# Patient Record
Sex: FEMALE | Race: WHITE | URBAN - METROPOLITAN AREA
[De-identification: names, ages, dates, MRNs, and addresses within clinical notes are randomized per-mention and may not be internally consistent; named-entity substitution may affect disease eponyms.]

---

## 2018-07-19 ENCOUNTER — DOCTOR'S OFFICE (OUTPATIENT)
Dept: URBAN - METROPOLITAN AREA CLINIC 137 | Facility: CLINIC | Age: 63
Setting detail: OPHTHALMOLOGY
End: 2018-07-19
Payer: COMMERCIAL

## 2018-07-19 ENCOUNTER — RX ONLY (RX ONLY)
Age: 63
End: 2018-07-19

## 2018-07-19 DIAGNOSIS — H52.4: ICD-10-CM

## 2018-07-19 PROCEDURE — 92004 COMPRE OPH EXAM NEW PT 1/>: CPT | Performed by: OPHTHALMOLOGY

## 2018-07-19 ASSESSMENT — REFRACTION_AUTOREFRACTION
OD_AXIS: 008
OS_AXIS: 008
OS_SPHERE: -0.75
OD_SPHERE: +2.00
OS_CYLINDER: +1.25
OD_CYLINDER: +1.25

## 2018-07-19 ASSESSMENT — REFRACTION_OUTSIDERX
OS_SPHERE: -0.75
OD_SPHERE: +2.00
OS_ADD: +2.25
OS_AXIS: 180
OS_VA1: 20/25+3
OD_AXIS: 180
OD_VA3: 20/
OD_VA1: 20/20
OS_CYLINDER: +1.25
OS_VA2: 20/20
OS_VA3: 20/
OD_VA2: 20/20
OD_ADD: +2.25
OU_VA: 20/20
OD_CYLINDER: +1.00

## 2018-07-19 ASSESSMENT — REFRACTION_MANIFEST
OS_VA2: 20/
OD_VA1: 20/
OD_VA2: 20/
OS_VA3: 20/
OS_VA1: 20/
OD_VA3: 20/
OU_VA: 20/
OS_VA1: 20/
OU_VA: 20/
OD_VA1: 20/
OD_VA2: 20/
OD_VA3: 20/
OS_VA2: 20/
OS_VA3: 20/

## 2018-07-19 ASSESSMENT — REFRACTION_CURRENTRX
OD_SPHERE: +1.50
OD_AXIS: 175
OS_ADD: +2.25
OD_OVR_VA: 20/
OS_SPHERE: +1.75
OD_VPRISM_DIRECTION: PROGS
OS_AXIS: 020
OD_OVR_VA: 20/
OD_ADD: +2.25
OD_OVR_VA: 20/
OD_CYLINDER: +1.25
OS_OVR_VA: 20/
OS_VPRISM_DIRECTION: PROGS
OS_CYLINDER: +1.75
OS_OVR_VA: 20/
OS_OVR_VA: 20/

## 2018-07-19 ASSESSMENT — SPHEQUIV_DERIVED
OS_SPHEQUIV: -0.125
OD_SPHEQUIV: 2.625

## 2018-07-19 ASSESSMENT — VISUAL ACUITY
OS_BCVA: 20/20-1
OD_BCVA: 20/400

## 2018-07-19 ASSESSMENT — CONFRONTATIONAL VISUAL FIELD TEST (CVF)
OS_FINDINGS: FULL
OD_FINDINGS: FULL

## 2018-08-14 LAB — HCV AB SER-ACNC: NON REACTIVE

## 2018-08-28 ENCOUNTER — DOCTOR'S OFFICE (OUTPATIENT)
Dept: URBAN - METROPOLITAN AREA CLINIC 136 | Facility: CLINIC | Age: 63
Setting detail: OPHTHALMOLOGY
End: 2018-08-28
Payer: COMMERCIAL

## 2018-08-28 DIAGNOSIS — H25.013: ICD-10-CM

## 2018-08-28 PROBLEM — H52.4 PRESBYOPIA ; BOTH EYES: Status: ACTIVE | Noted: 2018-07-19

## 2018-08-28 PROCEDURE — 92014 COMPRE OPH EXAM EST PT 1/>: CPT | Performed by: OPHTHALMOLOGY

## 2018-08-28 ASSESSMENT — VISUAL ACUITY
OS_BCVA: 20/70+1
OD_BCVA: 20/300

## 2018-08-28 ASSESSMENT — REFRACTION_CURRENTRX
OS_AXIS: 020
OS_OVR_VA: 20/
OD_OVR_VA: 20/
OS_OVR_VA: 20/
OS_SPHERE: +1.75
OS_OVR_VA: 20/
OS_CYLINDER: +1.75
OD_SPHERE: +1.50
OD_OVR_VA: 20/
OD_OVR_VA: 20/
OD_AXIS: 175
OD_CYLINDER: +1.25
OS_ADD: +2.25
OD_ADD: +2.25
OS_VPRISM_DIRECTION: PROGS
OD_VPRISM_DIRECTION: PROGS

## 2018-08-28 ASSESSMENT — REFRACTION_MANIFEST
OD_VA2: 20/
OD_VA2: 20/20
OU_VA: 20/20
OS_ADD: +2.25
OS_VA3: 20/
OS_VA1: 20/
OD_VA3: 20/
OD_ADD: +2.25
OD_SPHERE: +2.00
OD_VA1: 20/
OS_VA2: 20/
OD_VA1: 20/20
OS_VA2: 20/20
OD_AXIS: 180
OS_AXIS: 180
OS_CYLINDER: +1.25
OS_VA1: 20/25+3
OS_SPHERE: -0.75
OU_VA: 20/
OD_CYLINDER: +1.00
OD_VA3: 20/
OS_VA3: 20/

## 2018-08-28 ASSESSMENT — SPHEQUIV_DERIVED
OS_SPHEQUIV: 0
OD_SPHEQUIV: 3.125
OS_SPHEQUIV: -0.125
OD_SPHEQUIV: 2.5

## 2018-08-28 ASSESSMENT — REFRACTION_AUTOREFRACTION
OS_AXIS: 105
OS_SPHERE: +0.75
OD_AXIS: 102
OS_CYLINDER: -1.50
OD_CYLINDER: -1.25
OD_SPHERE: +3.75

## 2018-08-28 ASSESSMENT — CONFRONTATIONAL VISUAL FIELD TEST (CVF)
OD_FINDINGS: FULL
OS_FINDINGS: FULL

## 2020-08-28 LAB
LEFT EYE DIABETIC RETINOPATHY: NORMAL
RIGHT EYE DIABETIC RETINOPATHY: NORMAL

## 2020-10-25 PROBLEM — E11.9 TYPE 2 DIABETES MELLITUS WITHOUT COMPLICATION, WITHOUT LONG-TERM CURRENT USE OF INSULIN (HCC): Status: ACTIVE | Noted: 2020-08-25

## 2020-10-26 ENCOUNTER — OFFICE VISIT (OUTPATIENT)
Dept: FAMILY MEDICINE CLINIC | Facility: CLINIC | Age: 65
End: 2020-10-26
Payer: MEDICARE

## 2020-10-26 VITALS
HEART RATE: 63 BPM | RESPIRATION RATE: 16 BRPM | WEIGHT: 235.4 LBS | BODY MASS INDEX: 40.19 KG/M2 | DIASTOLIC BLOOD PRESSURE: 80 MMHG | OXYGEN SATURATION: 97 % | TEMPERATURE: 98 F | HEIGHT: 64 IN | SYSTOLIC BLOOD PRESSURE: 136 MMHG

## 2020-10-26 DIAGNOSIS — R06.02 SHORTNESS OF BREATH ON EXERTION: ICD-10-CM

## 2020-10-26 DIAGNOSIS — R60.0 LOWER EXTREMITY EDEMA: ICD-10-CM

## 2020-10-26 DIAGNOSIS — E78.5 HYPERLIPIDEMIA, UNSPECIFIED HYPERLIPIDEMIA TYPE: ICD-10-CM

## 2020-10-26 DIAGNOSIS — E11.9 TYPE 2 DIABETES MELLITUS WITHOUT COMPLICATION, WITHOUT LONG-TERM CURRENT USE OF INSULIN (HCC): Primary | ICD-10-CM

## 2020-10-26 DIAGNOSIS — Z00.00 MEDICARE ANNUAL WELLNESS VISIT, INITIAL: ICD-10-CM

## 2020-10-26 DIAGNOSIS — Z12.2 ENCOUNTER FOR SCREENING FOR LUNG CANCER: ICD-10-CM

## 2020-10-26 DIAGNOSIS — Z76.89 ENCOUNTER TO ESTABLISH CARE: ICD-10-CM

## 2020-10-26 DIAGNOSIS — Z78.0 POST-MENOPAUSAL: ICD-10-CM

## 2020-10-26 DIAGNOSIS — Z23 INFLUENZA VACCINE NEEDED: ICD-10-CM

## 2020-10-26 DIAGNOSIS — Z87.891 HISTORY OF TOBACCO ABUSE: ICD-10-CM

## 2020-10-26 DIAGNOSIS — E66.01 CLASS 3 SEVERE OBESITY DUE TO EXCESS CALORIES WITH SERIOUS COMORBIDITY AND BODY MASS INDEX (BMI) OF 40.0 TO 44.9 IN ADULT (HCC): ICD-10-CM

## 2020-10-26 PROBLEM — E66.813 CLASS 3 SEVERE OBESITY DUE TO EXCESS CALORIES WITH SERIOUS COMORBIDITY AND BODY MASS INDEX (BMI) OF 40.0 TO 44.9 IN ADULT (HCC): Status: ACTIVE | Noted: 2020-10-26

## 2020-10-26 PROBLEM — R06.09 DOE (DYSPNEA ON EXERTION): Status: ACTIVE | Noted: 2020-08-25

## 2020-10-26 PROBLEM — H26.9 CATARACTS, BILATERAL: Status: ACTIVE | Noted: 2020-10-26

## 2020-10-26 PROBLEM — R06.00 DOE (DYSPNEA ON EXERTION): Status: ACTIVE | Noted: 2020-08-25

## 2020-10-26 PROBLEM — R92.30 DENSE BREAST: Status: ACTIVE | Noted: 2018-09-07

## 2020-10-26 PROBLEM — R92.2 DENSE BREAST: Status: ACTIVE | Noted: 2018-09-07

## 2020-10-26 PROBLEM — H91.93 BILATERAL HEARING LOSS: Status: ACTIVE | Noted: 2020-08-25

## 2020-10-26 LAB
CREAT UR-MCNC: 107 MG/DL
MICROALBUMIN UR-MCNC: 19.6 MG/L (ref 0–20)
MICROALBUMIN/CREAT 24H UR: 18 MG/G CREATININE (ref 0–30)
SL AMB POCT HEMOGLOBIN AIC: 9.2 (ref ?–6.5)

## 2020-10-26 PROCEDURE — G0402 INITIAL PREVENTIVE EXAM: HCPCS | Performed by: NURSE PRACTITIONER

## 2020-10-26 PROCEDURE — 36416 COLLJ CAPILLARY BLOOD SPEC: CPT | Performed by: NURSE PRACTITIONER

## 2020-10-26 PROCEDURE — 90682 RIV4 VACC RECOMBINANT DNA IM: CPT | Performed by: NURSE PRACTITIONER

## 2020-10-26 PROCEDURE — 82570 ASSAY OF URINE CREATININE: CPT | Performed by: NURSE PRACTITIONER

## 2020-10-26 PROCEDURE — 99204 OFFICE O/P NEW MOD 45 MIN: CPT | Performed by: NURSE PRACTITIONER

## 2020-10-26 PROCEDURE — 83036 HEMOGLOBIN GLYCOSYLATED A1C: CPT | Performed by: NURSE PRACTITIONER

## 2020-10-26 PROCEDURE — 82043 UR ALBUMIN QUANTITATIVE: CPT | Performed by: NURSE PRACTITIONER

## 2020-10-26 PROCEDURE — G0008 ADMIN INFLUENZA VIRUS VAC: HCPCS | Performed by: NURSE PRACTITIONER

## 2020-11-02 ENCOUNTER — TELEPHONE (OUTPATIENT)
Dept: ADMINISTRATIVE | Facility: OTHER | Age: 65
End: 2020-11-02

## 2021-01-04 ENCOUNTER — APPOINTMENT (OUTPATIENT)
Dept: LAB | Facility: CLINIC | Age: 66
End: 2021-01-04
Payer: MEDICARE

## 2021-01-04 ENCOUNTER — OFFICE VISIT (OUTPATIENT)
Dept: FAMILY MEDICINE CLINIC | Facility: CLINIC | Age: 66
End: 2021-01-04
Payer: MEDICARE

## 2021-01-04 VITALS
DIASTOLIC BLOOD PRESSURE: 80 MMHG | HEIGHT: 64 IN | RESPIRATION RATE: 18 BRPM | HEART RATE: 81 BPM | OXYGEN SATURATION: 99 % | BODY MASS INDEX: 37.8 KG/M2 | SYSTOLIC BLOOD PRESSURE: 130 MMHG | TEMPERATURE: 97.5 F | WEIGHT: 221.38 LBS

## 2021-01-04 DIAGNOSIS — E11.65 UNCONTROLLED TYPE 2 DIABETES MELLITUS WITH HYPERGLYCEMIA (HCC): ICD-10-CM

## 2021-01-04 DIAGNOSIS — E11.9 TYPE 2 DIABETES MELLITUS WITHOUT COMPLICATION, WITHOUT LONG-TERM CURRENT USE OF INSULIN (HCC): ICD-10-CM

## 2021-01-04 DIAGNOSIS — R06.00 DOE (DYSPNEA ON EXERTION): ICD-10-CM

## 2021-01-04 DIAGNOSIS — E78.5 HYPERLIPIDEMIA, UNSPECIFIED HYPERLIPIDEMIA TYPE: ICD-10-CM

## 2021-01-04 DIAGNOSIS — Z01.818 PRE-OPERATIVE EXAM: Primary | ICD-10-CM

## 2021-01-04 DIAGNOSIS — H26.9 CATARACT OF BOTH EYES, UNSPECIFIED CATARACT TYPE: ICD-10-CM

## 2021-01-04 DIAGNOSIS — E66.01 CLASS 2 SEVERE OBESITY DUE TO EXCESS CALORIES WITH SERIOUS COMORBIDITY AND BODY MASS INDEX (BMI) OF 38.0 TO 38.9 IN ADULT (HCC): ICD-10-CM

## 2021-01-04 PROBLEM — E66.812 CLASS 2 SEVERE OBESITY DUE TO EXCESS CALORIES WITH SERIOUS COMORBIDITY AND BODY MASS INDEX (BMI) OF 38.0 TO 38.9 IN ADULT (HCC): Status: ACTIVE | Noted: 2020-10-26

## 2021-01-04 LAB
ALBUMIN SERPL BCP-MCNC: 3.5 G/DL (ref 3.5–5)
ALP SERPL-CCNC: 155 U/L (ref 46–116)
ALT SERPL W P-5'-P-CCNC: 24 U/L (ref 12–78)
ANION GAP SERPL CALCULATED.3IONS-SCNC: 6 MMOL/L (ref 4–13)
AST SERPL W P-5'-P-CCNC: 11 U/L (ref 5–45)
BASOPHILS # BLD AUTO: 0.06 THOUSANDS/ΜL (ref 0–0.1)
BASOPHILS NFR BLD AUTO: 1 % (ref 0–1)
BILIRUB SERPL-MCNC: 0.41 MG/DL (ref 0.2–1)
BUN SERPL-MCNC: 9 MG/DL (ref 5–25)
CALCIUM SERPL-MCNC: 9.8 MG/DL (ref 8.3–10.1)
CHLORIDE SERPL-SCNC: 104 MMOL/L (ref 100–108)
CHOLEST SERPL-MCNC: 227 MG/DL (ref 50–200)
CO2 SERPL-SCNC: 29 MMOL/L (ref 21–32)
CREAT SERPL-MCNC: 0.78 MG/DL (ref 0.6–1.3)
EOSINOPHIL # BLD AUTO: 0.19 THOUSAND/ΜL (ref 0–0.61)
EOSINOPHIL NFR BLD AUTO: 2 % (ref 0–6)
ERYTHROCYTE [DISTWIDTH] IN BLOOD BY AUTOMATED COUNT: 12.9 % (ref 11.6–15.1)
GFR SERPL CREATININE-BSD FRML MDRD: 80 ML/MIN/1.73SQ M
GLUCOSE P FAST SERPL-MCNC: 327 MG/DL (ref 65–99)
HCT VFR BLD AUTO: 41.4 % (ref 34.8–46.1)
HDLC SERPL-MCNC: 71 MG/DL
HGB BLD-MCNC: 12.6 G/DL (ref 11.5–15.4)
IMM GRANULOCYTES # BLD AUTO: 0.05 THOUSAND/UL (ref 0–0.2)
IMM GRANULOCYTES NFR BLD AUTO: 1 % (ref 0–2)
LDLC SERPL CALC-MCNC: 138 MG/DL (ref 0–100)
LYMPHOCYTES # BLD AUTO: 2.09 THOUSANDS/ΜL (ref 0.6–4.47)
LYMPHOCYTES NFR BLD AUTO: 20 % (ref 14–44)
MCH RBC QN AUTO: 27.5 PG (ref 26.8–34.3)
MCHC RBC AUTO-ENTMCNC: 30.4 G/DL (ref 31.4–37.4)
MCV RBC AUTO: 90 FL (ref 82–98)
MONOCYTES # BLD AUTO: 0.57 THOUSAND/ΜL (ref 0.17–1.22)
MONOCYTES NFR BLD AUTO: 6 % (ref 4–12)
NEUTROPHILS # BLD AUTO: 7.29 THOUSANDS/ΜL (ref 1.85–7.62)
NEUTS SEG NFR BLD AUTO: 70 % (ref 43–75)
NRBC BLD AUTO-RTO: 0 /100 WBCS
PLATELET # BLD AUTO: 295 THOUSANDS/UL (ref 149–390)
PMV BLD AUTO: 11.7 FL (ref 8.9–12.7)
POTASSIUM SERPL-SCNC: 4.3 MMOL/L (ref 3.5–5.3)
PROT SERPL-MCNC: 7.6 G/DL (ref 6.4–8.2)
RBC # BLD AUTO: 4.59 MILLION/UL (ref 3.81–5.12)
SL AMB POCT HEMOGLOBIN AIC: 11.8 (ref ?–6.5)
SODIUM SERPL-SCNC: 139 MMOL/L (ref 136–145)
TRIGL SERPL-MCNC: 90 MG/DL
TSH SERPL DL<=0.05 MIU/L-ACNC: 1.82 UIU/ML (ref 0.36–3.74)
WBC # BLD AUTO: 10.25 THOUSAND/UL (ref 4.31–10.16)

## 2021-01-04 PROCEDURE — 99214 OFFICE O/P EST MOD 30 MIN: CPT | Performed by: NURSE PRACTITIONER

## 2021-01-04 PROCEDURE — 84443 ASSAY THYROID STIM HORMONE: CPT

## 2021-01-04 PROCEDURE — 80053 COMPREHEN METABOLIC PANEL: CPT

## 2021-01-04 PROCEDURE — 80061 LIPID PANEL: CPT

## 2021-01-04 PROCEDURE — 83036 HEMOGLOBIN GLYCOSYLATED A1C: CPT | Performed by: NURSE PRACTITIONER

## 2021-01-04 PROCEDURE — 36415 COLL VENOUS BLD VENIPUNCTURE: CPT

## 2021-01-04 PROCEDURE — 93000 ELECTROCARDIOGRAM COMPLETE: CPT | Performed by: NURSE PRACTITIONER

## 2021-01-04 PROCEDURE — 85025 COMPLETE CBC W/AUTO DIFF WBC: CPT

## 2021-01-04 RX ORDER — BESIFLOXACIN 6 MG/ML
SUSPENSION OPHTHALMIC
COMMUNITY
Start: 2021-01-04

## 2021-01-04 RX ORDER — BROMFENAC SODIUM 0.7 MG/ML
SOLUTION/ DROPS OPHTHALMIC
COMMUNITY
Start: 2021-01-04

## 2021-01-04 NOTE — PROGRESS NOTES
FAMILY PRACTICE OFFICE VISIT       NAME: Liu Mcdaniel  AGE: 72 y o  SEX: female       : 1955        MRN: [id number]    Assessment and Plan     Problem List Items Addressed This Visit        Other    WILKES (dyspnea on exertion)    Class 2 severe obesity due to excess calories with serious comorbidity and body mass index (BMI) of 38 0 to 38 9 in adult (HCC)    Cataracts, bilateral    Relevant Medications    Besivance 0 6 % SUSP    Prolensa 0 07 % SOLN    Hyperlipidemia      Other Visit Diagnoses     Pre-operative exam    -  Primary    Relevant Orders    POCT ECG (Completed)    Uncontrolled type 2 diabetes mellitus with hyperglycemia ([facility])        Relevant Medications    sitaGLIPtin (JANUVIA) 100 mg tablet    Other Relevant Orders    Glucometer    Glucometer test strips    Lancets    POCT hemoglobin A1c (Completed)    Ambulatory referral to clinical pharmacy          1  Pre-operative exam  This 60-year-old female presents today for preoperative examination  She is scheduled for cataract surgery: Left eye on 2021, right eye  2021  In office ECG today shows normal sinus rhythm, left axis deviation, no acute ischemia, no comparison is available  Echocardiogram completed 6229 shows systolic function is normal with an ejection fraction of 55-60%  Wall motion is within normal limits  There is grade 1 mild diastolic dysfunction  She has ongoing shortness of breath with exertion present >6 months, for which she is in the process of completing workup  She has uncontrolled diabetes, which we are working to lower A1c  Chronic conditions are not under good control, however,  given short, minor procedure, with monitored anesthesia she is acceptable risk to proceed with cataract surgery as scheduled  We will continue to work on gaining better control over chronic conditions  Reviewed plan of care with Dr Pino Bhardwaj  POCT ECG   2   Cataract of both eyes, unspecified cataract type  Bilateral cataracts appreciated on exam today, left eye greater than right eye    3  Uncontrolled type 2 diabetes mellitus with hyperglycemia ([address] )    Barbra Mcbride was seen for the 1st time in our office approximately 2 months ago  Hemoglobin A1c at that time was 9 4%  She had previously been prescribed metformin, however after taking 1 dose of this medication developed GI upset and never took another dose  She did not contact her previous PCP to discuss  At last office visit, I prescribed Januvia 100 mg daily  She is only taking this medications sporadically, admits she forgets to take it, and has missed many doses  Hemoglobin A1c today is 11 8%  We had a lengthy discussion on importance of controlling diabetes and long-term consequences of uncontrolled sugars  She verbalizes understanding  Prescriptions provided for glucometer  I recommend starting a new routine, waking up each morning, checking her blood glucose, eating a good breakfast, and taking Januvia  Admits she is the primary caregiver for her mother, and has been forgetting to care for herself  States this should get better as she has now reached out to her family for help  She is able to do daily fasting blood sugars, she checks blood sugars at home on her mother  We discussed importance of avoiding sweets, reducing carbohydrates, and regular exercise  She is agreeable to consult with our clinical pharmacist, Brant Puente to help gain better control her blood glucoses  She will follow up with me in 1 month  Glucometer    Glucometer test strips    Lancets    sitaGLIPtin (JANUVIA) 100 mg tablet    POCT hemoglobin A1c    Ambulatory referral to clinical pharmacy   4  Class 2 severe obesity due to excess calories with serious comorbidity and body mass index (BMI) of 38 0 to 38 9 in [facility])  At last office visit, I referred her to weight management, she had scheduled appointment but needed to cancel it due to she could not leave her mother alone    I have encouraged her to reschedule this appointment  5  WILKES (dyspnea on exertion)  Shortness of breath with exertion has been present stable for greater than 6 months now, unclear etiology  She was evaluated by Cardiology in August, recommended to have a echo stress test   She has not proceeded with this yet  I continue to recommend she proceed with the echo stress test   Echocardiogram was completed as noted above  As per patient request she has been referred to a new cardiologist, a female cardiologist   She did schedule an appointment, however was canceled by the cardiologist's office  She plans to reschedule  6  Hyperlipidemia, unspecified hyperlipidemia type  Hyperlipidemia, she will be repeating lipid panel, will consider starting statin pending results  Chief Complaint     Chief Complaint   Patient presents with    Pre-op Exam     1/11/2021 2/1/2021 with dr Mary Jo Vazquez        History of Present Illness     Corina Erazo is a 72year old female with diabetes, hyperlipidemia, and obesity presenting today for preoperative exam     She is scheduled for cataract surgery, left eye on January 11th, and right eye on February 1st, with Dr Milly England  Admits she has had difficulty getting to appointments recently due to caring for her mother  Her 25-year-old mother lives with her and Eric Black is her primary caretaker  Mom has been getting worse, and she is afraid to leave mom alone  Carey's daughter is currently home, and is able to help  Once her daughter leaves, Carey's brother and sister-in-law will be helping her  Uncontrolled diabetes: We started Januvia at last office visit  She is tolerating well, admits she has difficulty remembering to take the medication  She misses a lot of doses  She is trying to eat better  Review of Systems   Review of Systems   Constitutional: Negative  HENT: Negative  Eyes: Positive for visual disturbance     Respiratory: Positive for shortness of breath  Negative for cough ( with exertion) and wheezing  Cardiovascular: Positive for leg swelling ( bilateral lower extremity edema is at baseline)  Negative for chest pain and palpitations  Gastrointestinal: Negative  Endocrine: Negative for polydipsia, polyphagia and polyuria  Genitourinary: Negative  Musculoskeletal: Negative  Skin: Negative  Neurological: Negative  Hematological: Negative  Psychiatric/Behavioral: Negative          Active Problem List     Patient Active Problem List   Diagnosis    Type 2 diabetes mellitus without complication, without long-term current use of insulin (HCC)    WILKES (dyspnea on exertion)    Dense breast    Bilateral hearing loss    Class 2 severe obesity due to excess calories with serious comorbidity and body mass index (BMI) of 38 0 to 38 9 in adult ([facility] [address] )    Cataracts, bilateral    Hyperlipidemia       Past Medical History:  Past Medical History:   Diagnosis Date    Varicella        Past Surgical History:  Past Surgical History:   Procedure Laterality Date    NO PAST SURGERIES         Family History:  Family History   Problem Relation Age of Onset    Diabetes Mother     Hyperlipidemia Mother     Hypertension Mother     Atrial fibrillation Mother     Breast cancer Sister     Coronary artery disease Brother         3 stents    Hypertension Brother     Hyperlipidemia Brother     Thyroid cancer Daughter     Lung cancer Maternal Grandfather     Cervical cancer Cousin        Social History:  Social History     Socioeconomic History    Marital status:      Spouse name: Not on file    Number of children: Not on file    Years of education: Not on file    Highest education level: Not on file   Occupational History    Not on file   Social Needs    Financial resource strain: Not on file    Food insecurity     Worry: Not on file     Inability: Not on file    Transportation needs     Medical: Not on file     Non-medical: Not on file   Tobacco Use    Smoking status: Former Smoker     Packs/day: 1 50     Years: 40 00     Pack years: 60 00     Types: Cigarettes     Quit date: 10/26/2012     Years since quittin 2    Smokeless tobacco: Never Used   Substance and Sexual Activity    Alcohol use: Yes     Comment: occasionally    Drug use: Never    Sexual activity: Not on file   Lifestyle    Physical activity     Days per week: Not on file     Minutes per session: Not on file    Stress: Not on file   Relationships    Social connections     Talks on phone: Not on file     Gets together: Not on file     Attends [employer] service: Not on file     Active member of club or organization: Not on file     Attends meetings of clubs or organizations: Not on file     Relationship status: Not on file    Intimate partner violence     Fear of current or ex partner: Not on file     Emotionally abused: Not on file     Physically abused: Not on file     Forced sexual activity: Not on file   Other Topics Concern    Not on file   Social History Narrative        1 daughter    Lives with her daughter and mother    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       I have reviewed the patient's medical history in detail; there are no changes to the history as noted in the electronic medical record  Objective     Vitals:    21 1106 21 1130   BP: 140/90 130/80   Pulse: 81    Resp: 18    Temp: 97 5 °F (36 4 °C)    SpO2: 99%    Weight: 100 kg (221 lb 6 oz)    Height: 5' 4" (1 626 m)      Wt Readings from Last 3 Encounters:   21 100 kg (221 lb 6 oz)   10/26/20 107 kg (235 lb 6 4 oz)     Physical Exam  Vitals signs and nursing note reviewed  Constitutional:       General: She is not in acute distress  Appearance: Normal appearance  She is well-developed  She is obese  She is not ill-appearing, toxic-appearing or diaphoretic  HENT:      Head: Normocephalic and atraumatic        Right Ear: Tympanic membrane and ear canal normal       Left Ear: Tympanic membrane and ear canal normal    Eyes:      Conjunctiva/sclera: Conjunctivae normal       Pupils: Pupils are equal, round, and reactive to light  Comments:  Bilateral cataracts, left greater than right   Neck:      Musculoskeletal: Normal range of motion and neck supple  Thyroid: No thyromegaly  Cardiovascular:      Rate and Rhythm: Normal rate and regular rhythm  Heart sounds: No murmur  Pulmonary:      Effort: Pulmonary effort is normal  No respiratory distress  Breath sounds: Normal breath sounds  No wheezing or rales  Abdominal:      General: Bowel sounds are normal       Palpations: Abdomen is soft  Tenderness: There is no abdominal tenderness  Musculoskeletal:         General: No deformity  Right lower le+ Pitting Edema present  Left lower le+ Pitting Edema present  Lymphadenopathy:      Cervical: No cervical adenopathy  Skin:     Findings: No rash  Neurological:      Mental Status: She is alert and oriented to person, place, and time  Psychiatric:         Mood and Affect: Mood normal          Behavior: Behavior normal             ALLERGIES:  No Known Allergies    Current Medications     Current Outpatient Medications   Medication Sig Dispense Refill    sitaGLIPtin (JANUVIA) 100 mg tablet Take 1 tablet (100 mg total) by mouth daily 30 tablet 3    atorvastatin (LIPITOR) 10 mg tablet Take 1 tablet (10 mg total) by mouth daily 30 tablet 5    Besivance 0 6 % SUSP       Prolensa 0 07 % SOLN        No current facility-administered medications for this visit            Health Maintenance     Health Maintenance   Topic Date Due    HIV Screening  10/28/1970    DTaP,Tdap,and Td Vaccines (2 - Td) 2017    Pneumococcal Vaccine: 65+ Years (1 of 1 - PPSV23) 10/28/2020    HEMOGLOBIN A1C  2021    MAMMOGRAM  2021    DM Eye Exam  2021    Fall Risk  10/26/2021    Depression Screening PHQ  10/26/2021    Medicare Annual Wellness Visit (AWV)  10/26/2021    BMI: Followup Plan  10/26/2021    Diabetic Foot Exam  10/26/2021    URINE MICROALBUMIN  10/26/2021    BMI: Adult  01/04/2022    Cervical Cancer Screening  09/07/2023    Colorectal Cancer Screening  10/12/2028    Hepatitis C Screening  Completed    Influenza Vaccine  Completed    HIB Vaccine  Aged Out    Hepatitis B Vaccine  Aged Out    IPV Vaccine  Aged Out    Hepatitis A Vaccine  Aged Out    Meningococcal ACWY Vaccine  Aged Out    HPV Vaccine  Aged Out     Immunization History   Administered Date(s) Administered    INFLUENZA 09/19/2015, 09/06/2018, 10/23/2020    Influenza Quadrivalent Recombinant Preservative Free IM 10/23/2020    Influenza, recombinant, quadrivalent,injectable, preservative free 10/26/2020    Tdap 09/11/2007       CHRISSY Duque

## 2021-01-06 ENCOUNTER — TELEPHONE (OUTPATIENT)
Dept: FAMILY MEDICINE CLINIC | Facility: CLINIC | Age: 66
End: 2021-01-06

## 2021-01-06 DIAGNOSIS — R74.8 ELEVATED ALKALINE PHOSPHATASE LEVEL: ICD-10-CM

## 2021-01-06 DIAGNOSIS — E11.9 TYPE 2 DIABETES MELLITUS WITHOUT COMPLICATION, WITHOUT LONG-TERM CURRENT USE OF INSULIN (HCC): Primary | ICD-10-CM

## 2021-01-06 DIAGNOSIS — E78.5 HYPERLIPIDEMIA, UNSPECIFIED HYPERLIPIDEMIA TYPE: ICD-10-CM

## 2021-01-06 RX ORDER — ATORVASTATIN CALCIUM 10 MG/1
10 TABLET, FILM COATED ORAL DAILY
Qty: 30 TABLET | Refills: 5 | Status: SHIPPED | OUTPATIENT
Start: 2021-01-06

## 2021-01-06 NOTE — TELEPHONE ENCOUNTER
----- Message from [id number] sent at 1/6/2021  7:52 AM EST -----  Please let patient know results of blood work  Blood sugar was 327  One liver function was mildly elevated, this was elevated on your last blood work also  Please get an ultrasound of your liver  Cholesterol is high  I recommend taking a cholesterol lowering medication, Atorvastatin 10 mg daily  Please follow up in office with me in 1 month as we discussed

## 2021-01-07 ENCOUNTER — TELEPHONE (OUTPATIENT)
Dept: FAMILY MEDICINE CLINIC | Facility: CLINIC | Age: 66
End: 2021-01-07

## 2021-01-07 NOTE — TELEPHONE ENCOUNTER
Message left for [facility] to notify them hemoglobin A1c is 11 8% prior to upcoming cataract surgery  Will await response

## 2021-01-18 ENCOUNTER — CLINICAL SUPPORT (OUTPATIENT)
Dept: FAMILY MEDICINE CLINIC | Facility: CLINIC | Age: 66
End: 2021-01-18

## 2021-01-18 DIAGNOSIS — E11.9 TYPE 2 DIABETES MELLITUS WITHOUT COMPLICATION, WITHOUT LONG-TERM CURRENT USE OF INSULIN (HCC): Primary | ICD-10-CM

## 2021-01-18 DIAGNOSIS — E11.65 UNCONTROLLED TYPE 2 DIABETES MELLITUS WITH HYPERGLYCEMIA (HCC): ICD-10-CM

## 2021-01-18 RX ORDER — BLOOD-GLUCOSE METER
EACH MISCELLANEOUS
COMMUNITY
Start: 2021-01-04

## 2021-01-18 RX ORDER — BLOOD SUGAR DIAGNOSTIC
STRIP MISCELLANEOUS DAILY
COMMUNITY
Start: 2021-01-04 | End: 2021-02-05 | Stop reason: SDUPTHER

## 2021-01-18 RX ORDER — LANCETS 33 GAUGE
EACH MISCELLANEOUS DAILY
COMMUNITY
Start: 2021-01-04 | End: 2021-02-05 | Stop reason: SDUPTHER

## 2021-01-18 NOTE — PROGRESS NOTES
[address] [facility]   Freddie Dye, PharmD       Reason for visit: Appointment with 72y o  year old for management of T2DM monitoring efficacy and tolerability of anti-diabetic medications  Current DM Regimen:   Januvia 100mg daily (patient admits poor compliance until recent visit with [facility], since she has been taking daily)    ASSESSMENT/PLAN                                                                                     1  Type 2 diabetes: goal A1c <7 based on 2019 ADA guidelines    Most recent A1c above goal 11 4 (Jan 2021) and increased from previous 9 2 (October 2020)     Duration: 6 months - 1 year  Microvascular complications: none noted  Macrovascular complications: none noted  (No) Aspirin (Yes ) Statin (No) ACEI/ARB  Eye Exam:  Completed August 2020 - due Aug 2021   Foot Exam: Completed October 2020 - due with each PCP visit ideally     Most recent labs and diabetes goals discussed with patient in clinic today   MEDICATIONS: If PCP is in agreeance,   o Will pend rx for PCP signature/concurrence  o Start Jardiance 10mg daily for added A1C lowering, CHF and CKD benefit   SMBG: patient conducting daily FBG since visit with [facility], showing steady decline starting in 400s and now in mid 200s, average x 10 days 316mg/dL   TLCs: Re-enforced the importance of a Diabetic Diet, exercise 30 minutes 5 days a week as tolerated, weight loss/control   REFERRALS PLACED: none, but patient planning on scheduling diabetic ed (referral already placed)    2  Hyperlipidemia without clinical ASCVD event: 2018 ACC/AHA lipid guidelines recommend high intensity statin      The 10-year ASCVD risk score (Rl Angeles et al , 2013) is: 10 4%    Values used to calculate the score:      Age: 72 years      Sex: Female      Is Non- : No      Diabetic: Yes      Tobacco smoker: No      Systolic Blood Pressure: 561 mmHg      Is BP treated: No      HDL Cholesterol: 71 mg/dL Total Cholesterol: 227 mg/dL     Patient not taking atorvastatin 10mg daily as prescribed   She prefers not to be on medication and is working on diet changes   Will trial diet control until labs in April then reevaluate statin need    3  HTN in the presence of DM: BP goal less than 130/80 mmHg  clinic BP below goal, HR acceptable  Serum K+ WNL   MEDICATIONS: none   HOME MONITORING: not conducted   TLCs: discussed exercise and salt limitation    4  Medication Reconciliation: Medication list reviewed with pt at today's visit  Discrepancies as discussed below  - Medication list updated to reflect medications pt is currently taking   - Renewal request sent to prescribing provider for: none    5  TLC:  - Medication adherence is good  - Eating habits are fair  - Exercise habits are poor    6  Preventative Care  Immunizations: annual flu UTD, Tdap due TODAY, PCV13 DUE TODAY followed by PPSV23 1 year later  Referrals:none  Labs:due April including A1C, CMP, urine microalbumin, lipid panel    Patient-specific goals to achieve prior to next visit:  1  Start Jardiance 10mg daily  2  Work on diet changes for DM and cholesterol  3  Consider PCV13 at next PCP visit    Follow-up with pharmacy in 1 week for SMBG and med check    Follow-Up:   _x_ Home Monitoring Records, BG  _x_ Labs April      SUBJECTIVE                                                                                                            Diagnosed with diabetes: approx  1 year ago at [facility] PCP  ASCVD History: none    Previous DM medications/tolerability:    Metformin - GI upset    SMBG are performed regularly  She checks BG 1 times per day, including FBG  She did bring blood glucose log today  ADA Goals: Pre-meal goal= 80  130 mg/dL; Post-meal goal <180 mg/dL    She denies hypoglycemia and admits hyperglycemia symptoms  Recent symptoms include: blurry vision, increased fatigue, polydipsia, polyphagia and polyuria   Past symptoms include: increased fatigue, polydipsia, polyphagia and polyuria Hypoglycemia treatment includes: snack, juice, milk, candy  Exercise habits: She is not active  (Goal at least 150 minutes per week)  Previous habits: none   Barriers to improving exercise: cannot see correctly right now, waiting to recover from eye surgery as instructed by surgeon      1  Medication Adherence: Medication list reviewed with patient, reports the following discrepancies/problems:   Not taking statin    Misses doses:  No, not recently, however prior to PCP visit in January frequently missed Januvia    Hypoglycemia history: 0 SMBG readings < 70mg/dL since last appt       Social History     Tobacco Use   Smoking Status Former Smoker    Packs/day: 1 50    Years: 40 00    Pack years: 60 00    Types: Cigarettes    Quit date: 10/26/2012    Years since quittin 2   Smokeless Tobacco Never Used     Social History     Substance and Sexual Activity   Alcohol Use Yes    Comment: occasionally          OBJECTIVE                                                                                                         reports that she quit smoking about 8 years ago  Her smoking use included cigarettes  She has a 60 00 pack-year smoking history   She has never used smokeless tobacco     Immunization History   Administered Date(s) Administered    INFLUENZA 2015, 2018, 10/23/2020    Influenza Quadrivalent Recombinant Preservative Free IM 10/23/2020    Influenza, recombinant, quadrivalent,injectable, preservative free 10/26/2020    Tdap 2007       Allergies:     No Known Allergies    Current Outpatient Medications on File Prior to Visit   Medication Sig Dispense Refill    atorvastatin (LIPITOR) 10 mg tablet Take 1 tablet (10 mg total) by mouth daily 30 tablet 5    Besivance 0 6 % SUSP       Prolensa 0 07 % SOLN       sitaGLIPtin (JANUVIA) 100 mg tablet Take 1 tablet (100 mg total) by mouth daily 30 tablet 3     No current facility-administered medications on file prior to visit  I have reviewed the patient's allergies, medications and history as noted in the electronic medical record and updated as necessary  Vital Signs: Wt Readings from Last 3 Encounters:   21 100 kg (221 lb 6 oz)   10/26/20 107 kg (235 lb 6 4 oz)      BP Readings from Last 3 Encounters:   21 130/80   10/26/20 136/80      Pulse Readings from Last 3 Encounters:   21 81   10/26/20 63           Pertinent Lab Data:   Patient was fasting for most recent labs  Hemoglobin A1C   Date Value   2021 11 8 (A)   10/26/2020 9 2 (A)   2018 7 0 % (H)      Cholesterol (mg/dL)   Date Value   2021 227 (H)     Triglycerides (mg/dL)   Date Value   2021 90     HDL, Direct (mg/dL)   Date Value   2021 71     LDL Calculated (mg/dL)   Date Value   2021 138 (H)     No components found for: CREATSERUM, CREATFLUID, CREATADULT, MICROALBUMIN, MICROALBUPOC, POTASSIUM  ALT (U/L)   Date Value   2021 24     AST (U/L)   Date Value   2021 11     Alkaline Phosphatase (U/L)   Date Value   2021 155 (H)      No results found for: [id number]    Microalbumin/Creatinine:   Microalb Creat Ratio (mg/g creatinine)   Date Value   10/26/2020 18           Preventative Care:  Last dilated eye exam (annually): 2020  Last foot exam (annually):  2020  Last dental exam (every 6 months): unknown  Last microalbumin (annually, goal <30 mg/gm): 2020  Influenza vaccination status: Influenza: Influenza Up To Date This Year  Influenza Recommended Annually  Tetanus vaccination status:  last tetanus booster >10 years ago  Pneumonia vaccination status: Pneumococcal: Risks and Benefits Discussed  Prevnar 13 due today  Hepatitis B vaccination status: unknown      Goals:  SMBG: once a day  FP-130  2hrPPG: <180  A1C: < 7  BP: < 130 / < 80  LDL: < 100 mg/dl (CHD or "CHD risk equivalent" is present)    Demographics  Interaction Method: Face to Face  Type of Intervention: New    Topic(s) Addressed  Diabetes    Intervention(s) Made    Pharmacologic:  Medication Initiation    Prevent or Manage Adverse Drug Reaction    Drug Interaction    Non-Pharmacologic:  Adherence Addressed    Preventive Care Gap Closure Recommendation    Lab Ordered    Other    Tool(s) Used  Not Applicable    Time Spent:   Time Spent in Direct Patient Care: 45 minutes    Time Spent in Care Coordination: 45 minutes    Recommendation(s) Accepted by the Patient/Caregiver:  All Accepted

## 2021-01-18 NOTE — Clinical Note
Will pend Jardiance for you, not taking statin, pcv13 due at next PCP appt, will check in 1 week for med adherence and smbg

## 2021-01-25 ENCOUNTER — TELEMEDICINE (OUTPATIENT)
Dept: FAMILY MEDICINE CLINIC | Facility: CLINIC | Age: 66
End: 2021-01-25
Payer: MEDICARE

## 2021-01-25 DIAGNOSIS — Z20.822 EXPOSURE TO COVID-19 VIRUS: ICD-10-CM

## 2021-01-25 DIAGNOSIS — B34.9 VIRAL INFECTION, UNSPECIFIED: ICD-10-CM

## 2021-01-25 PROCEDURE — 99441 PR PHYS/QHP TELEPHONE EVALUATION 5-10 MIN: CPT | Performed by: NURSE PRACTITIONER

## 2021-01-25 PROCEDURE — U0005 INFEC AGEN DETEC AMPLI PROBE: HCPCS | Performed by: NURSE PRACTITIONER

## 2021-01-25 PROCEDURE — U0003 INFECTIOUS AGENT DETECTION BY NUCLEIC ACID (DNA OR RNA); SEVERE ACUTE RESPIRATORY SYNDROME CORONAVIRUS 2 (SARS-COV-2) (CORONAVIRUS DISEASE [COVID-19]), AMPLIFIED PROBE TECHNIQUE, MAKING USE OF HIGH THROUGHPUT TECHNOLOGIES AS DESCRIBED BY CMS-2020-01-R: HCPCS | Performed by: NURSE PRACTITIONER

## 2021-01-25 NOTE — PROGRESS NOTES
COVID-19 Virtual Visit     Assessment/Plan:    Problem List Items Addressed This Visit     None      Visit Diagnoses     Exposure to COVID-19 virus        Relevant Orders    Novel Coronavirus (Covid-19),PCR [facility] - Collected at [facility] or [facility] (Completed)    Viral infection, unspecified        Relevant Orders    Novel Coronavirus (Covid-19),PCR [facility] - Collected at [facility] or [facility] (Completed)         Disposition:     I referred patient to one of our centralized sites for a COVID-19 swab  This 69-year-old female presents today with symptoms concerning for COVID-19  Her daughter tested positive for COVID-19 last week  They do live in the same household  They have been masking and staying apart  Will send for COVID-19 swab a centralized swabbing location today  She will continue supportive care rest, fluids, Tylenol as needed  Our office will call her with results as soon as available  She will call with any worsening of symptoms in the interim  I have spent 10 minutes directly with the patient  Encounter provider [address] [place], [place] [id number]    Provider located at [address] [place] [id number]    Recent Visits  Date Type Provider Dept   01/25/21 Telemedicine Humera Cerda, [address] [place] [address] recent visits within past 7 days and meeting all other requirements     Future Appointments  No visits were found meeting these conditions  Showing future appointments within next 150 days and meeting all other requirements        Patient agrees to participate in a virtual check in via telephone or video visit instead of presenting to the office to address urgent/immediate medical needs  Patient is aware this is a billable service  After connecting through Telephone, the patient was identified by name and date of birth   [facility] was informed that this was a telemedicine visit and that the exam was being conducted confidentially over secure lines  My office door was closed  No one else was in the room  Ena Fernandez acknowledged consent and understanding of privacy and security of the telemedicine visit  I informed the patient that I have reviewed her record in Epic and presented the opportunity for her to ask any questions regarding the visit today  The patient agreed to participate  It was my intent to perform this visit via video technology but the patient was not able to do a video connection so the visit was completed via audio telephone only  Subjective:   Ena Fernandez is a 72 y o  female who is concerned about COVID-19  Patient's symptoms include fever (max 100), chills, fatigue, malaise, sore throat (scratchy), cough (mild), myalgias and headache  Patient denies congestion, rhinorrhea, anosmia, loss of taste, shortness of breath, chest tightness, abdominal pain, nausea, vomiting and diarrhea  Date of symptom onset: 1/22/2021    Exposure:   Contact with a person who is under investigation (PUI) for or who is positive for COVID-19 within the last 14 days?: Yes    Hospitalized recently for fever and/or lower respiratory symptoms?: No      Currently a healthcare worker that is involved in direct patient care?: No      Works in a special setting where the risk of COVID-19 transmission may be high? (this may include long-term care, correctional and [facility] facilities; homeless shelters; assisted-living facilities and group homes ): No      Resident in a special setting where the risk of COVID-19 transmission may be high? (this may include long-term care, correctional and [facility] facilities; homeless shelters; assisted-living facilities and group homes ): No       Patient's daughter tested positive for COVID-19 last week  They do live in the same household but have been masking and staying apart  She is taking over-the-counter Tylenol as needed      Lab Results   Component Value Date    SARSCOV2 Negative 01/25/2021    SARSCOV2 Not Detected 10/31/2020    [facility] Not Detected 09/17/2020     Past Medical History:   Diagnosis Date    Varicella      Past Surgical History:   Procedure Laterality Date    NO PAST SURGERIES       Current Outpatient Medications   Medication Sig Dispense Refill    atorvastatin (LIPITOR) 10 mg tablet Take 1 tablet (10 mg total) by mouth daily (Patient not taking: Reported on 1/18/2021) 30 tablet 5    Besivance 0 6 % SUSP       Blood Glucose Monitoring Suppl (OneTouch Verio Flex System) w/Device KIT Use as directed      Empagliflozin 10 MG TABS Take 1 tablet (10 mg total) by mouth every morning 30 tablet 2    OneTouch Delica Lancets 06M MISC daily      OneTouch Verio test strip daily      Prolensa 0 07 % SOLN       sitaGLIPtin (JANUVIA) 100 mg tablet Take 1 tablet (100 mg total) by mouth daily 30 tablet 3     No current facility-administered medications for this visit  No Known Allergies    Review of Systems   Constitutional: Positive for chills, fatigue and fever (max 100)  HENT: Positive for sore throat (scratchy)  Negative for congestion and rhinorrhea  Respiratory: Positive for cough (mild)  Negative for chest tightness and shortness of breath  Gastrointestinal: Negative for abdominal pain, diarrhea, nausea and vomiting  Musculoskeletal: Positive for myalgias  Neurological: Positive for headaches  Objective: There were no vitals filed for this visit  VIRTUAL VISIT DISCLAIMER    Katie Locke acknowledges that she has consented to an online visit or consultation  She understands that the online visit is based solely on information provided by her, and that, in the absence of a face-to-face physical evaluation by the physician, the diagnosis she receives is both limited and provisional in terms of accuracy and completeness  This is not intended to replace a full medical face-to-face evaluation by the physician   Katie Locke understands and accepts these terms

## 2021-01-26 LAB — SARS-COV-2 RNA RESP QL NAA+PROBE: NEGATIVE

## 2021-02-02 ENCOUNTER — CLINICAL SUPPORT (OUTPATIENT)
Dept: FAMILY MEDICINE CLINIC | Facility: CLINIC | Age: 66
End: 2021-02-02

## 2021-02-02 DIAGNOSIS — E11.9 TYPE 2 DIABETES MELLITUS WITHOUT COMPLICATION, WITHOUT LONG-TERM CURRENT USE OF INSULIN (HCC): Primary | ICD-10-CM

## 2021-02-02 NOTE — PROGRESS NOTES
Called patient for Jardiance start and SMBG assessment       · Patient picked up [address] 1/19 but did not start it yet - stressors plus was feeling sick/thought she had COVID/daughter currently has COVID  · Reasonable cost  · Reports SMBG is steadily decreasing since resuming her medications, watching what she eats, and using sugar substitute Truvia  · 233 mg/dL this morning - previously reports in 300s per patient      Will check in after she has started the Jardiance in 2 weeks       Demographics  Interaction Method: Virtual  Type of Intervention: Follow-Up    Topic(s) Addressed  Diabetes    Intervention(s) Made      Non-Pharmacologic:  Adherence Addressed    Other    Tool(s) Used  Not Applicable    Time Spent:   Time Spent in Direct Patient Care: 15 minutes    Time Spent in Care Coordination: 15 minutes    Recommendation(s) Accepted by the Patient/Caregiver: Not Applicable

## 2021-02-05 DIAGNOSIS — E11.65 UNCONTROLLED TYPE 2 DIABETES MELLITUS WITH HYPERGLYCEMIA (HCC): ICD-10-CM

## 2021-02-05 DIAGNOSIS — E11.9 TYPE 2 DIABETES MELLITUS WITHOUT COMPLICATION, WITHOUT LONG-TERM CURRENT USE OF INSULIN (HCC): ICD-10-CM

## 2021-02-05 RX ORDER — BLOOD-GLUCOSE METER
EACH MISCELLANEOUS
Status: CANCELLED | OUTPATIENT
Start: 2021-02-05

## 2021-02-07 RX ORDER — BLOOD SUGAR DIAGNOSTIC
1 STRIP MISCELLANEOUS DAILY
Qty: 100 EACH | Refills: 1 | Status: SHIPPED | OUTPATIENT
Start: 2021-02-07 | End: 2021-06-28 | Stop reason: SDUPTHER

## 2021-02-07 RX ORDER — LANCETS 33 GAUGE
EACH MISCELLANEOUS DAILY
Qty: 100 EACH | Refills: 1 | Status: SHIPPED | OUTPATIENT
Start: 2021-02-07 | End: 2021-06-28 | Stop reason: SDUPTHER

## 2021-02-22 ENCOUNTER — CLINICAL SUPPORT (OUTPATIENT)
Dept: FAMILY MEDICINE CLINIC | Facility: CLINIC | Age: 66
End: 2021-02-22

## 2021-02-22 DIAGNOSIS — E11.9 TYPE 2 DIABETES MELLITUS WITHOUT COMPLICATION, WITHOUT LONG-TERM CURRENT USE OF INSULIN (HCC): Primary | ICD-10-CM

## 2021-02-22 NOTE — PROGRESS NOTES
Called patient for Jardiance start and SMBG assessment  · Patient picked up Jardiance 1/19, took for approx  2 weeks but developed a yeast infection  · She would not like to retry it or any other medication in the SGLT2 class for this reason  · Reports SMBG is steadily decreasing since resuming her medications, watching what she eats, and using sugar substitute Truvia  · 223 and 236mg/dL last two FBG  · 233 mg/dL reported three weeks ago  · previously reports in 300s per patient  · Intolerance to metformin, not interested in insulin, on DPPIV so no GLP1RA at this moment, mild diastolic dysfunction so TZDs not the best option  · Glimepiride/Glipizide alternatives  · Patient would like to try diet interventions x 2 weeks to see if FBG responds prior to trying MARCOS      Will check in 2 weeks       Demographics  Interaction Method: Virtual  Type of Intervention: Follow-Up    Topic(s) Addressed  Diabetes    Intervention(s) Made    Pharmacologic:      Medication Discontinuation    Prevent or Manage Adverse Drug Reaction    Non-Pharmacologic:  Adherence Addressed    Other    Tool(s) Used  Not Applicable    Time Spent:   Time Spent in Direct Patient Care: 15 minutes    Time Spent in Care Coordination: 15 minutes    Recommendation(s) Accepted by the Patient/Caregiver:  All Accepted

## 2021-03-17 DIAGNOSIS — E78.5 HYPERLIPIDEMIA, UNSPECIFIED HYPERLIPIDEMIA TYPE: ICD-10-CM

## 2021-03-17 DIAGNOSIS — E11.9 TYPE 2 DIABETES MELLITUS WITHOUT COMPLICATION, WITHOUT LONG-TERM CURRENT USE OF INSULIN (HCC): Primary | ICD-10-CM

## 2021-03-17 DIAGNOSIS — E66.01 CLASS 2 SEVERE OBESITY DUE TO EXCESS CALORIES WITH SERIOUS COMORBIDITY AND BODY MASS INDEX (BMI) OF 38.0 TO 38.9 IN ADULT (HCC): ICD-10-CM

## 2021-03-26 ENCOUNTER — OFFICE VISIT (OUTPATIENT)
Dept: FAMILY MEDICINE CLINIC | Facility: CLINIC | Age: 66
End: 2021-03-26
Payer: MEDICARE

## 2021-03-26 VITALS
BODY MASS INDEX: 37.94 KG/M2 | TEMPERATURE: 98 F | WEIGHT: 222.2 LBS | SYSTOLIC BLOOD PRESSURE: 130 MMHG | DIASTOLIC BLOOD PRESSURE: 80 MMHG | OXYGEN SATURATION: 98 % | HEART RATE: 72 BPM | RESPIRATION RATE: 18 BRPM | HEIGHT: 64 IN

## 2021-03-26 DIAGNOSIS — E11.9 TYPE 2 DIABETES MELLITUS WITHOUT COMPLICATION, WITHOUT LONG-TERM CURRENT USE OF INSULIN (HCC): ICD-10-CM

## 2021-03-26 DIAGNOSIS — Z01.818 PRE-OPERATIVE EXAMINATION: Primary | ICD-10-CM

## 2021-03-26 DIAGNOSIS — E78.5 HYPERLIPIDEMIA, UNSPECIFIED HYPERLIPIDEMIA TYPE: ICD-10-CM

## 2021-03-26 DIAGNOSIS — H26.9 CATARACT OF BOTH EYES, UNSPECIFIED CATARACT TYPE: ICD-10-CM

## 2021-03-26 DIAGNOSIS — R06.00 DOE (DYSPNEA ON EXERTION): ICD-10-CM

## 2021-03-26 DIAGNOSIS — E66.01 CLASS 2 SEVERE OBESITY DUE TO EXCESS CALORIES WITH SERIOUS COMORBIDITY AND BODY MASS INDEX (BMI) OF 38.0 TO 38.9 IN ADULT (HCC): ICD-10-CM

## 2021-03-26 LAB — SL AMB POCT HEMOGLOBIN AIC: 10.2 (ref ?–6.5)

## 2021-03-26 PROCEDURE — 83036 HEMOGLOBIN GLYCOSYLATED A1C: CPT | Performed by: NURSE PRACTITIONER

## 2021-03-26 PROCEDURE — 99214 OFFICE O/P EST MOD 30 MIN: CPT | Performed by: NURSE PRACTITIONER

## 2021-03-26 RX ORDER — GLIMEPIRIDE 1 MG/1
1 TABLET ORAL
Qty: 30 TABLET | Refills: 5 | Status: SHIPPED | OUTPATIENT
Start: 2021-03-26 | End: 2021-06-17 | Stop reason: SDUPTHER

## 2021-03-26 NOTE — PROGRESS NOTES
FAMILY PRACTICE OFFICE VISIT       NAME: Alecia Mcdaniel  AGE: 72 y o  SEX: female       : 1955        MRN: [id number]    Assessment and Plan     Problem List Items Addressed This Visit        Endocrine    Type 2 diabetes mellitus without complication, without long-term current use of insulin (HCC)    Relevant Medications    glimepiride (AMARYL) 1 mg tablet    Other Relevant Orders    POCT hemoglobin A1c (Completed)       Other    WILKES (dyspnea on exertion)    Class 2 severe obesity due to excess calories with serious comorbidity and body mass index (BMI) of 38 0 to 38 9 in adult ([facility] )    Cataracts, bilateral    Hyperlipidemia      Other Visit Diagnoses     Pre-operative examination    -  Primary          1  Pre-operative examination     2  Type 2 diabetes mellitus without complication, without long-term current use of insulin (HCC)  POCT hemoglobin A1c    glimepiride (AMARYL) 1 mg tablet   3  Cataract of both eyes, unspecified cataract type     4  Class 2 severe obesity due to excess calories with serious comorbidity and body mass index (BMI) of 38 0 to 38 9 in adult ([facility] [address] )     5  Hyperlipidemia, unspecified hyperlipidemia type     6  WILKES (dyspnea on exertion)       This 59-year-old female presents today for preoperative examination  She is scheduled for right eye cataract surgery on  with Dr Wagner Juarez  Review of chronic conditions:    Type 2 diabetes:  Hemoglobin A1c has improved from 11 6% to 10 4%  Continue Januvia 100 mg daily  Was not able to tolerate metformin or Jardiance  Will add glimepiride 1 mg daily with breakfast   She will continue to check fasting blood sugars daily  I will have clinical pharmacist, Magy Marcos continue to follow with patient on a regular basis  Foot exam, eye exam, urine for microalbumin are up-to-date  Due for Prevnar 13, followed by Pneumovax 23 in 1 year  However she is trying to schedule COVID-19 vaccinations    Recommend obtaining 228 0020 vaccinations 1st, then we will proceed with Prevnar 13 vaccination  She is aware, COVID-19 vaccinations must be  from other vaccinations by 2 weeks  Obesity: BMI Counseling: Body mass index is 38 14 kg/m²  The BMI is above normal  Nutrition recommendations include 3-5 servings of fruits/vegetables daily, moderation in carbohydrate intake and increasing intake of lean protein  Exercise recommendations include exercising 3-5 times per week  Hyperlipidemia:  Recent lipid panel with total cholesterol 227, triglycerides 90, HDL 71,   She has been prescribed atorvastatin 10 mg daily, although she has not been taking this  Does not want to take any more medications  Discussed importance of atorvastatin to help reduce cardiovascular risk factors  Diabetes doubles, almost triples risk of cardiovascular disease  ASCVD risk score today 10 4%  Reviewed with patient this means in the next 10 years she has a 10% risk of having a heart attack or stroke  Atorvastatin will help reduce risk of  cardiovascular events  She will consider taking this medication  I also have asked her to consider adding daily baby aspirin 81 mg, after cataract surgery  WILKES:  Shortness of breath has significantly improved  I continue to recommend she complete echo stress test and follow-up with Cardiology as previously discussed  She verbalizes understanding and is planning to schedule  In office ECG in January 2021, shows normal sinus rhythm, left axis deviation, no acute ischemia, no comparison is available  Echocardiogram completed 72/29/7617 shows systolic function is normal with an ejection fraction of 55-60%  Wall motion is within normal limits  There is grade 1 mild diastolic dysfunction  Chronic conditions are not under good control, although are improving with efforts from patient and close monitoring  She is acceptable risk to proceed with cataract surgery as scheduled     She will repeat blood work and return to the office for follow up in July  Chief Complaint     Chief Complaint   Patient presents with    Pre-op Exam     paperwork for eye surgery       History of Present Illness     Salazar Nelson  Is a 59-year-old female presenting today for preoperative clearance  She is scheduled for right eye cataract surgery on April 12th with Dr Coral De Leon  She was originally scheduled in early February, however her daughter, who is also a household member, contracted COVID-19 and surgery was postponed  Diabetes: Blood sugars are improving  Fasting sugar was 204 today  She continues to take Januvia 100 mg once daily  She was not able to tolerate Jardiance, due to  Vaginal yeast infection  She is trying to improve her diet  She is not taking atorvastatin  Does not want to take any more medications  She is trying to get an appointment for COVID-19 vaccination  She is due for pneumonia vaccines, Prevnar Pneumovax  Will work on getting COVID-19 vaccinations 1st     States previous symptoms of shortness of breath have improved  She has information for scheduling with Cardiology  She also has a prescription for an echo stress test, which she has not scheduled yet  Review of Systems   Review of Systems   Constitutional: Negative  HENT: Negative  Eyes: Positive for visual disturbance (cloudy vision, cataracts  )  Respiratory: Negative  Cardiovascular: Negative  Gastrointestinal: Negative  Genitourinary: Negative  Musculoskeletal: Negative  Skin: Negative  Neurological: Negative  Hematological: Negative  Psychiatric/Behavioral: Negative          Active Problem List     Patient Active Problem List   Diagnosis    Type 2 diabetes mellitus without complication, without long-term current use of insulin (HCC)    WILKES (dyspnea on exertion)    Dense breast    Bilateral hearing loss    Class 2 severe obesity due to excess calories with serious comorbidity and body mass index (BMI) of 38 0 to 38 9 in adult (HCC)    Cataracts, bilateral    Hyperlipidemia       Past Medical History:  Past Medical History:   Diagnosis Date    Varicella        Past Surgical History:  Past Surgical History:   Procedure Laterality Date    NO PAST SURGERIES         Family History:  Family History   Problem Relation Age of Onset    Diabetes Mother    Gerson Weiss Hyperlipidemia Mother     Hypertension Mother     Atrial fibrillation Mother     Breast cancer Sister     Coronary artery disease Brother         3 stents    Hypertension Brother    Gerson Weiss Hyperlipidemia Brother     Thyroid cancer Daughter     Lung cancer Maternal Grandfather     Cervical cancer Cousin        Social History:  Social History     Socioeconomic History    Marital status:      Spouse name: Not on file    Number of children: Not on file    Years of education: Not on file    Highest education level: Not on file   Occupational History    Not on file   Social Needs    Financial resource strain: Not on file    Food insecurity     Worry: Not on file     Inability: Not on file    Transportation needs     Medical: Not on file     Non-medical: Not on file   Tobacco Use    Smoking status: Former Smoker     Packs/day: 1 50     Years: 40 00     Pack years: 60 00     Types: Cigarettes     Quit date: 10/26/2012     Years since quittin 4    Smokeless tobacco: Never Used   Substance and Sexual Activity    Alcohol use: Yes     Comment: occasionally    Drug use: Never    Sexual activity: Not on file   Lifestyle    Physical activity     Days per week: Not on file     Minutes per session: Not on file    Stress: Not on file   Relationships    Social connections     Talks on phone: Not on file     Gets together: Not on file     Attends [employer] service: Not on file     Active member of club or organization: Not on file     Attends meetings of clubs or organizations: Not on file     Relationship status: Not on file   Gerson Weiss Intimate partner violence     Fear of current or ex partner: Not on file     Emotionally abused: Not on file     Physically abused: Not on file     Forced sexual activity: Not on file   Other Topics Concern    Not on file   Social History Narrative        1 daughter    Lives with her daughter and mother    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       I have reviewed the patient's medical history in detail; there are no changes to the history as noted in the electronic medical record  Objective     Vitals:    03/26/21 1332   BP: 130/80   Pulse: 72   Resp: 18   Temp: 98 °F (36 7 °C)   TempSrc: Temporal   SpO2: 98%   Weight: 101 kg (222 lb 3 2 oz)   Height: 5' 4" (1 626 m)     Wt Readings from Last 3 Encounters:   03/26/21 101 kg (222 lb 3 2 oz)   01/04/21 100 kg (221 lb 6 oz)   10/26/20 107 kg (235 lb 6 4 oz)     Physical Exam  Vitals signs and nursing note reviewed  Constitutional:       General: She is not in acute distress  Appearance: Normal appearance  She is not ill-appearing, toxic-appearing or diaphoretic  HENT:      Head: Normocephalic and atraumatic  Right Ear: Tympanic membrane and ear canal normal       Left Ear: Tympanic membrane and ear canal normal       Mouth/Throat:      Mouth: Mucous membranes are moist       Pharynx: Oropharynx is clear  Eyes:      Conjunctiva/sclera: Conjunctivae normal       Pupils: Pupils are equal, round, and reactive to light  Comments: Cataracts bilaterally   Neck:      Musculoskeletal: Normal range of motion and neck supple  Thyroid: No thyromegaly  Vascular: No carotid bruit  Cardiovascular:      Rate and Rhythm: Normal rate and regular rhythm  Heart sounds: No murmur  Pulmonary:      Effort: Pulmonary effort is normal  No respiratory distress  Breath sounds: Normal breath sounds  No wheezing or rales  Abdominal:      General: Abdomen is flat  Palpations: Abdomen is soft  Tenderness: There is no abdominal tenderness  Musculoskeletal:      Comments: Trace bilateral lower extremity pitting edema  Lymphadenopathy:      Cervical: No cervical adenopathy  Skin:     General: Skin is warm and dry  Findings: No rash  Neurological:      Mental Status: She is alert and oriented to person, place, and time  Psychiatric:         Mood and Affect: Mood normal        BMI Counseling: Body mass index is 38 14 kg/m²  The BMI is above normal  Nutrition recommendations include moderation in carbohydrate intake and increasing intake of lean protein  ALLERGIES:  No Known Allergies    Current Medications     Current Outpatient Medications   Medication Sig Dispense Refill    Blood Glucose Monitoring Suppl (OneTouch Verio Flex System) w/Device KIT Use as directed      OneTouch Delica Lancets 98Z MISC Use daily 100 each 1    OneTouch Verio test strip Use 1 each daily 100 each 1    sitaGLIPtin (JANUVIA) 100 mg tablet Take 1 tablet (100 mg total) by mouth daily 30 tablet 3    atorvastatin (LIPITOR) 10 mg tablet Take 1 tablet (10 mg total) by mouth daily (Patient not taking: Reported on 1/18/2021) 30 tablet 5    Besivance 0 6 % SUSP       glimepiride (AMARYL) 1 mg tablet Take 1 tablet (1 mg total) by mouth daily with breakfast 30 tablet 5    Prolensa 0 07 % SOLN        No current facility-administered medications for this visit            Health Maintenance     Health Maintenance   Topic Date Due    HIV Screening  Never done    COVID-19 Vaccine (1) Never done    Lung Cancer Screening  Never done    DTaP,Tdap,and Td Vaccines (2 - Td) 09/11/2017    Pneumococcal Vaccine: 65+ Years (1 of 1 - PPSV23) Never done    MAMMOGRAM  08/04/2021    DM Eye Exam  08/28/2021    HEMOGLOBIN A1C  09/26/2021    Fall Risk  10/26/2021    Depression Screening PHQ  10/26/2021    Medicare Annual Wellness Visit (AWV)  10/26/2021    BMI: Followup Plan  10/26/2021    Diabetic Foot Exam  10/26/2021    URINE MICROALBUMIN  10/26/2021    BMI: Adult  03/26/2022    Cervical Cancer Screening  09/07/2023    Colorectal Cancer Screening  10/12/2028    Hepatitis C Screening  Completed    Influenza Vaccine  Completed    HIB Vaccine  Aged Out    Hepatitis B Vaccine  Aged Out    IPV Vaccine  Aged Out    Hepatitis A Vaccine  Aged Out    Meningococcal ACWY Vaccine  Aged Out    HPV Vaccine  Aged Out     Immunization History   Administered Date(s) Administered    INFLUENZA 09/19/2015, 09/06/2018, 10/23/2020    Influenza Quadrivalent Recombinant Preservative Free IM 10/23/2020    Influenza, recombinant, quadrivalent,injectable, preservative free 10/26/2020    Tdap 09/11/2007       CHRISSY Lorenzo

## 2021-03-31 ENCOUNTER — IMMUNIZATIONS (OUTPATIENT)
Dept: FAMILY MEDICINE CLINIC | Facility: HOSPITAL | Age: 66
End: 2021-03-31

## 2021-03-31 DIAGNOSIS — Z23 ENCOUNTER FOR IMMUNIZATION: Primary | ICD-10-CM

## 2021-03-31 PROCEDURE — 0001A SARS-COV-2 / COVID-19 MRNA VACCINE (PFIZER-BIONTECH) 30 MCG: CPT

## 2021-03-31 PROCEDURE — 91300 SARS-COV-2 / COVID-19 MRNA VACCINE (PFIZER-BIONTECH) 30 MCG: CPT

## 2021-04-02 ENCOUNTER — CLINICAL SUPPORT (OUTPATIENT)
Dept: FAMILY MEDICINE CLINIC | Facility: CLINIC | Age: 66
End: 2021-04-02

## 2021-04-02 DIAGNOSIS — E11.9 TYPE 2 DIABETES MELLITUS WITHOUT COMPLICATION, WITHOUT LONG-TERM CURRENT USE OF INSULIN (HCC): Primary | ICD-10-CM

## 2021-04-02 NOTE — PROGRESS NOTES
[facility]  MILENA  Spoke with patient as follow-up to PCP pre op appt last week:    · Patient just started glimepiride 1mg daily TODAY with breakfast - so far no AEs to report  · She continues to take her [place] and [place]  · She had bad AEs to metformin as well as Jardiance  · She wishes to wait for repeat labs end of June including lipid panel to assess need for statin - will work on diet in interim, discussed risks/benefits   · FBG yesterday was 204mg/dL, normally around 212-214 per patient with highest she could remember at 244mg/dL     Recommendations:   · Pharmacist to follow in 2 weeks for FBG and AE assessment    Demographics  Interaction Method: Virtual  Type of Intervention: Follow-Up    Topic(s) Addressed  Diabetes    Intervention(s) Made    Pharmacologic:      Prevent or Manage Adverse Drug Reaction    Non-Pharmacologic:  Adherence Addressed    Tool(s) Used  Not Applicable    Time Spent:   Time Spent in Direct Patient Care: 20 minutes    Time Spent in Care Coordination: 20 minutes    Recommendation(s) Accepted by the Patient/Caregiver:  All Accepted

## 2021-04-23 ENCOUNTER — IMMUNIZATIONS (OUTPATIENT)
Dept: FAMILY MEDICINE CLINIC | Facility: HOSPITAL | Age: 66
End: 2021-04-23

## 2021-04-23 DIAGNOSIS — Z23 ENCOUNTER FOR IMMUNIZATION: Primary | ICD-10-CM

## 2021-04-23 PROCEDURE — 91300 SARS-COV-2 / COVID-19 MRNA VACCINE (PFIZER-BIONTECH) 30 MCG: CPT

## 2021-04-23 PROCEDURE — 0002A SARS-COV-2 / COVID-19 MRNA VACCINE (PFIZER-BIONTECH) 30 MCG: CPT

## 2021-06-02 ENCOUNTER — OFFICE VISIT (OUTPATIENT)
Dept: ENDOCRINOLOGY | Facility: CLINIC | Age: 66
End: 2021-06-02
Payer: MEDICARE

## 2021-06-02 VITALS
HEIGHT: 64 IN | BODY MASS INDEX: 38.41 KG/M2 | HEART RATE: 73 BPM | WEIGHT: 225 LBS | SYSTOLIC BLOOD PRESSURE: 130 MMHG | DIASTOLIC BLOOD PRESSURE: 70 MMHG

## 2021-06-02 DIAGNOSIS — E78.5 HYPERLIPIDEMIA, UNSPECIFIED HYPERLIPIDEMIA TYPE: ICD-10-CM

## 2021-06-02 DIAGNOSIS — E11.9 TYPE 2 DIABETES MELLITUS WITHOUT COMPLICATION, WITHOUT LONG-TERM CURRENT USE OF INSULIN (HCC): Primary | ICD-10-CM

## 2021-06-02 PROCEDURE — 99204 OFFICE O/P NEW MOD 45 MIN: CPT | Performed by: INTERNAL MEDICINE

## 2021-06-02 NOTE — PROGRESS NOTES
New Patient Progress Note      Chief Complaint   Patient presents with    Diabetes Type 2      Referring Provider  Chari Franco  [address] [address]     History of Present Illness:   Sha Maria is a 72 y o  female with a history of type 2 diabetes seen in consultation at the request of [address], [facility]  She recalls being diagnosed with diabetes in 2020, and possibly pre-diabetes  She was seeing another practice and there are labs from 2018 in the diabetic range  She was trialed on metformin but could not tolerate GI effects, and had a  candidal infection on Jardiance  Her Bgs have improved in the past month  Denies micro or macrovascular complications  Denies hospitalizations for severe hypoglycemic or severe hyperglycemic episodes  Denies any issues with her current regimen  Current regimen: glimepiride 1mg in the morning and sitaglitan 100mg in the morning    Recalled Home blood glucose readings:   Before breakfast: 130, 114, 112  She does recall a Bg of 400    Hypoglycemic episodes: denies having any    Diabetes education: None   Diet: Has coffee in the mornig with an English muffin; lunch is cheese; dinner includes a meat with veggie and possibly a salad  She will eat through th day, cookies, cakes, candy  Stopped drinking soda, now drinking flavored water  She will occ have OJ  Activity: Daily activity is limited  She has WILKES and cannot walk more thena  Block She ahsa  Lung scan and stress test scheduled  Opthamology: s/p cataract removal in May 2021; does not recall a DM exam   Podiatry: does not see  Dental: yes, but not for over a year  Infuenza vaccine: yes  COVID: yes    No BP medications  Has hyperlipidemia: followed by PCP; on atorvastatin 10mg and tolerating well, no myalgias      Thyroid disorders: none  History of pancreatitis: no personal hx of pancreatitis, denies hx of alcohol use, galbladder issues or hypertrig    Patient Active Problem List   Diagnosis    Type 2 diabetes mellitus without complication, without long-term current use of insulin (HCC)    WILKES (dyspnea on exertion)    Dense breast    Bilateral hearing loss    Class 2 severe obesity due to excess calories with serious comorbidity and body mass index (BMI) of 38 0 to 38 9 in adult ([facility] [address] )    Cataracts, bilateral    Hyperlipidemia      Past Medical History:   Diagnosis Date    Varicella       Past Surgical History:   Procedure Laterality Date    NO PAST SURGERIES        Family History   Problem Relation Age of Onset    Diabetes Mother     Hyperlipidemia Mother     Hypertension Mother    Cayla Pachecot Atrial fibrillation Mother     Breast cancer Sister     Coronary artery disease Brother         3 stents    Hypertension Brother     Hyperlipidemia Brother     Thyroid cancer Daughter     Lung cancer Maternal Grandfather     Cervical cancer Cousin     Diabetes Father      Social History     Tobacco Use    Smoking status: Former Smoker     Packs/day: 1 50     Years: 40 00     Pack years: 60 00     Types: Cigarettes     Quit date: 10/26/2012     Years since quittin 6    Smokeless tobacco: Never Used   Substance Use Topics    Alcohol use: Yes     Comment: occasionally     No Known Allergies      Current Outpatient Medications:     Blood Glucose Monitoring Suppl (OneTouch Verio Flex System) w/Device KIT, Use as directed, Disp: , Rfl:     glimepiride (AMARYL) 1 mg tablet, Take 1 tablet (1 mg total) by mouth daily with breakfast, Disp: 30 tablet, Rfl: 5    OneTouch Delica Lancets 00Z MISC, Use daily, Disp: 100 each, Rfl: 1    OneTouch Verio test strip, Use 1 each daily, Disp: 100 each, Rfl: 1    sitaGLIPtin (JANUVIA) 100 mg tablet, Take 1 tablet (100 mg total) by mouth daily, Disp: 30 tablet, Rfl: 3    atorvastatin (LIPITOR) 10 mg tablet, Take 1 tablet (10 mg total) by mouth daily (Patient not taking: Reported on 2021), Disp: 30 tablet, Rfl: 5    Besivance 0 6 % SUSP, , Disp: , Rfl:     Prolensa 0 07 % SOLN, , Disp: , Rfl:   Review of Systems   Constitutional: Positive for fatigue  Negative for unexpected weight change  HENT: Positive for hearing loss  Negative for trouble swallowing and voice change  Eyes: Negative for visual disturbance  Respiratory: Positive for shortness of breath  Cardiovascular: Positive for leg swelling  Negative for chest pain  Gastrointestinal: Negative for constipation, diarrhea and nausea  Endocrine: Negative for polydipsia and polyuria  Genitourinary:        Denies nocturia   Skin: Negative for rash  Neurological: Negative for tremors and weakness  Psychiatric/Behavioral: The patient is not nervous/anxious  All other systems reviewed and are negative  Physical Exam:  Body mass index is 38 62 kg/m²    /70 (BP Location: Left arm, Patient Position: Sitting)   Pulse 73   Ht 5' 4" (1 626 m)   Wt 102 kg (225 lb)   BMI 38 62 kg/m²    Wt Readings from Last 3 Encounters:   06/02/21 102 kg (225 lb)   03/26/21 101 kg (222 lb 3 2 oz)   01/04/21 100 kg (221 lb 6 oz)       GEN: NAD  E/n/m: mask in place, hearing decreased, tongue midline, lips nl  Eyes: no stare or proptosis, EOMI  Neck: trachea midline, thyroid NT to palpation, nl in size, no nodules or neck masses noted, no cervical LAD  CV; heart reg rate s1s2 nl, no m/r/g appreciated, no VIN  Resp: CTAB, good effort  Ab+BS  Neuro: no tremor, 2+ DTRs in BUE  MS: no c/c in digits, moves all 4 ext, nl muscle bulk, gait nl  Skin: warm and dry, no palmar erythema  Psych: nl mood and affect, no gross lapses in memory    Labs:     Lab Results   Component Value Date    HGBA1C 10 2 (A) 03/26/2021         Lab Results   Component Value Date    CREATININE 0 78 01/04/2021    BUN 9 01/04/2021    K 4 3 01/04/2021     01/04/2021    CO2 29 01/04/2021     eGFR   Date Value Ref Range Status   01/04/2021 80 ml/min/1 73sq m Final         Lab Results   Component Value Date    HDL 71 01/04/2021 TRIG 90 01/04/2021     Lab Results   Component Value Date    CHOLESTEROL 227 (H) 01/04/2021     Lab Results   Component Value Date    HDL 71 01/04/2021     Lab Results   Component Value Date    TRIG 90 01/04/2021       No results found for: Mariza    Lab Results   Component Value Date    ALT 24 01/04/2021    AST 11 01/04/2021    ALKPHOS 155 (H) 01/04/2021       No results found for: TSH, FREET4, TSI    Impression:  1  Type 2 diabetes mellitus without complication, without long-term current use of insulin ([facility] [address] )    2  Hyperlipidemia, unspecified hyperlipidemia type           Plan:    [facility] was seen today for diabetes type 2  Diagnoses and all orders for this visit:    Type 2 diabetes mellitus without complication, without long-term current use of insulin ([facility])  -     Cancel: POCT hemoglobin A1c  -     Ambulatory referral to Diabetic Education; Future  -     Ambulatory referral to Podiatry; Future  -     Basic metabolic panel Lab Collect; Future  -     Hemoglobin A1C; Future    Hyperlipidemia, unspecified hyperlipidemia type      1  Type 2 diabetes mellitus without complication, without long-term current use of insulin ([facility] )       1  T2DM, uncontrolled: Reviewed timing and doses of medication  Reviewed need to have consistent meals and avoid calorie dense snacking  Congratulated her on stopping her soda intake  Advised checking Bgs1 x daily at varying time with logs sent to the office in a month  Her a1c will also be due then  Recommend DM education and nutrition  Asked that she return for a visit with NP or PA in July to review progress  Referral for podiatry given  2  Hyperlipidemia: Taking and tolerating statin well  Discussed with the patient and all questioned fully answered  She will call me if any problems arise      Counseled patient on diagnostic results, prognosis, risk and benefit of treatment options, instruction for management, importance of treatment compliance, Risk  factor reduction and impressions      Nissa Fowler MD

## 2021-06-07 ENCOUNTER — TELEPHONE (OUTPATIENT)
Dept: FAMILY MEDICINE CLINIC | Facility: CLINIC | Age: 66
End: 2021-06-07

## 2021-06-07 ENCOUNTER — HOSPITAL ENCOUNTER (OUTPATIENT)
Dept: RADIOLOGY | Facility: HOSPITAL | Age: 66
Discharge: HOME | End: 2021-06-07
Payer: MEDICARE

## 2021-06-07 ENCOUNTER — TRANSCRIBE ORDERS (OUTPATIENT)
Dept: RADIOLOGY | Facility: HOSPITAL | Age: 66
End: 2021-06-07

## 2021-06-07 DIAGNOSIS — Z87.891 HISTORY OF TOBACCO ABUSE: ICD-10-CM

## 2021-06-07 DIAGNOSIS — Z12.2 ENCOUNTER FOR SCREENING FOR LUNG CANCER: ICD-10-CM

## 2021-06-07 PROCEDURE — 71271 CT THORAX LUNG CANCER SCR C-: CPT

## 2021-06-07 NOTE — TELEPHONE ENCOUNTER
[address] [facility]  Janett Aldridge, PharmD, BCPS, BCACP     Reason for Outreach: Patient previously seen by Kaylie Barksdale, Pharmacist      First outreach attempt to patient to schedule an appointment  Patient unavailable at time of all  Left voicemail with contact information for return call

## 2021-06-11 ENCOUNTER — TELEPHONE (OUTPATIENT)
Dept: FAMILY MEDICINE CLINIC | Facility: CLINIC | Age: 66
End: 2021-06-11

## 2021-06-11 NOTE — TELEPHONE ENCOUNTER
Please ask Kerri Contreras to schedule an appointment today  The photo she sent is very fuzzy and difficult to see  Thank you        ----- Message from Eldertonfelton Blair sent at 6/11/2021  8:01 AM EDT -----  Regarding: FW: Non-Urgent Medical Question  Contact: [phone]  Do you want her to schedule an appt?  ----- Message -----  From: Adarsh Chapa  Sent: 6/10/2021   6:18 PM EDT  To: [facility]  Subject: Non-Urgent Medical Question                      This evening I noticed there is some type of red rash on my one eyelid  It does not hurt but is itchy and my eye seems to be a bit swollen  Is there anything I should be putting on it?  Thank You

## 2021-06-14 ENCOUNTER — TELEPHONE (OUTPATIENT)
Dept: FAMILY MEDICINE CLINIC | Facility: CLINIC | Age: 66
End: 2021-06-14

## 2021-06-14 NOTE — TELEPHONE ENCOUNTER
----- Message from [address] [facility] sent at 6/14/2021  4:20 PM EDT -----  Please let patient know CT scan for jose cancer surveillance is good with no suspicious nodules  Recommend repeating CT scan in one year for continued surveillance

## 2021-06-17 DIAGNOSIS — E11.9 TYPE 2 DIABETES MELLITUS WITHOUT COMPLICATION, WITHOUT LONG-TERM CURRENT USE OF INSULIN (HCC): ICD-10-CM

## 2021-06-17 RX ORDER — GLIMEPIRIDE 1 MG/1
1 TABLET ORAL
Qty: 90 TABLET | Refills: 1 | Status: SHIPPED | OUTPATIENT
Start: 2021-06-17 | End: 2021-07-07 | Stop reason: SDUPTHER

## 2021-06-21 NOTE — TELEPHONE ENCOUNTER
[address] [facility] Jes Gottlieb, PharmD, BCPS, BCACP    Reason for Outreach: Patient previously seen by Crissy Momin, Pharmacist      Called patient to schedule an appointment  Patient amenable to schedule in-person appointment at this time  Appointment scheduled for July 12, 2021 at [facility] patient to bring blood glucose monitoring log for review         Upcoming Appointments:  · [facility] [place] - 6/30/2021 @ 1pm  · [facility] [place] - 7/28/2021 @ 10:45am

## 2021-06-29 ENCOUNTER — HOSPITAL ENCOUNTER (OUTPATIENT)
Dept: NON INVASIVE DIAGNOSTICS | Facility: CLINIC | Age: 66
Discharge: HOME | End: 2021-06-29
Payer: MEDICARE

## 2021-06-29 DIAGNOSIS — R06.02 SHORTNESS OF BREATH ON EXERTION: ICD-10-CM

## 2021-06-29 PROCEDURE — 93350 STRESS TTE ONLY: CPT

## 2021-06-29 PROCEDURE — 93351 STRESS TTE COMPLETE: CPT | Performed by: INTERNAL MEDICINE

## 2021-06-30 ENCOUNTER — TELEPHONE (OUTPATIENT)
Dept: FAMILY MEDICINE CLINIC | Facility: CLINIC | Age: 66
End: 2021-06-30

## 2021-06-30 ENCOUNTER — OFFICE VISIT (OUTPATIENT)
Dept: DIABETES SERVICES | Facility: CLINIC | Age: 66
End: 2021-06-30
Payer: MEDICARE

## 2021-06-30 DIAGNOSIS — E11.9 TYPE 2 DIABETES MELLITUS WITHOUT COMPLICATION, WITHOUT LONG-TERM CURRENT USE OF INSULIN (HCC): Primary | ICD-10-CM

## 2021-06-30 LAB
CHEST PAIN STATEMENT: NORMAL
MAX DIASTOLIC BP: 90 MMHG
MAX HEART RATE: 146 BPM
MAX PREDICTED HEART RATE: 155 BPM
MAX. SYSTOLIC BP: 174 MMHG
PROTOCOL NAME: NORMAL
REASON FOR TERMINATION: NORMAL
TARGET HR FORMULA: NORMAL
TEST INDICATION: NORMAL
TIME IN EXERCISE PHASE: NORMAL

## 2021-06-30 PROCEDURE — G0108 DIAB MANAGE TRN  PER INDIV: HCPCS | Performed by: DIETITIAN, REGISTERED

## 2021-06-30 NOTE — PROGRESS NOTES
Type 2 Diabetes Class Assessment    HPI: Met with Rm Montez for DSME Initial visit  Saji Simon has Type 2 Diabetes  Saji Simon will take Living Well with Diabetes virtual classes in July from 6:00-8:00PM      Diabetes Assessment  Visit Type: Initial visit  Present at Session: patient   Medical Diagnosis/ICD 10: E11 9  Special Learning Needs: Yes - both ears (hereditary)  Barriers to Learning: hearing    How do you learn best? Visual learner  What are you most interested in learning about regards to your diabetes? Proper diet  How do you feel about making lifestyle changes at this time? Very much needs to  How would you rate your current knowledge of diabetes? good  How confident are you that will be able to take better control of your diabetes?: good    How long have you had diabetes? New diagnosis  Have you had diabetes education in the past?: No  Do you have any family members with diabetes?: Yes - father  Do you monitor your blood sugar? yes  Type of blood sugar monitor: One Touch Meter  How old is your meter?: new  How often do you test your blood sugars?:once daily and staggering the testing times  Do you keep a written record of your blood sugars? Yes   Blood sugar log with patient today and reviewed by educator?: No   Blood Sugar ranges:    Fastin-190   Before meals: low 100's - 140   2 hours after meals: n/a  Any financial concerns pertaining to your diabetes supplies, medication or care?: No  Have you ever experienced hypoglycemia?:  Yes - had a 57 this morning  Have you ever been hospitalized or gone to the ER due to your blood sugars?: No  How do you treat low blood sugars?: drank a lot of OJ and ate a granola bar  How do you treat high blood sugars? Tries not to eat as much sugar or sweets  Do you wear a Diabetes I D ?: no  Where do you dispose of your sharps (needles,lancetes)? : throws in the trash; instructed on how to properly dispose of sharps    Lab Results   Component Value Date    HGBA1C 10 2 (A) 03/26/2021    HGBA1C 11 8 (A) 01/04/2021    HGBA1C 9 2 (A) 10/26/2020       No results found for: CHOL  Lab Results   Component Value Date    HDL 71 01/04/2021     Lab Results   Component Value Date    LDLCALC 138 (H) 01/04/2021     Lab Results   Component Value Date    TRIG 90 01/04/2021     No results found for: CHOLHDL  No results found for: Lindsay Almeida    There is no height or weight on file to calculate BMI  Ht Readings from Last 1 Encounters:   06/02/21 5' 4" (1 626 m)     Wt Readings from Last 1 Encounters:   06/02/21 102 kg (225 lb)     Weight Change: Yes Weight gain of 30-35 pounds in the past year   (stopped working to stay home and care for mother)    Diet Assessment    Do you follow any special diet presently?: Yes - trying to avoid sweets and goodies  Who cooks at home?:  patient  Who does the grocery shopping?: patient   How frequently do you eat out?: rarely    Activity Assessment    Exercise: not exercising secondary to gets SOB on exertion      Lifestyle/Social Assessment    Racial/ethnic group:                                       Primary Language: English  Marital Status:   Education Level: [address]   Work status: Retired  Type of job and hours: n/a  Who lives in your household?: parent(s) child  Who is you primary support person(s): child (daughter)  Describe your quality of life currently?: good  Any concerns for your safety?: No  Any Jehovah's witness or cultural practices that may affect your diabetes care: No  Do you have a decrease or loss of hearing?: Yes - hereditary hearing problem  Do you have a decrease or loss of vision?: No  When was the last time you had an ophthalmology exam?: 4/15/2021  When was the last time you had dental exam?: year and a half ago  Do you check your feet for cracks, sores, debris?: Yes  When was the last time you had podiatry or foot exam?: 10/26/2020  Last flu shot?: yes  Pneumonia shot?: Yes    The patient's history was reviewed and updated as appropriate: allergies, current medications  Intervention    Diabetes Overview :   Chen Concepcion was instructed on basic concepts of diabetes, including identifying role of diabetes self management, basic pathophysiology and types of diabetes, A1c and blood sugar targets  Chen Concepcion has good understanding of material covered  Taking Medications: Instructed patient on action, side effects, efficacy, prescribed dosage and appropriate timing and frequency of administration of her diabetes medication  Chen Concepcion has good understanding of material covered  Monitoring Blood Sugars  Instructions for Meter Teaching- Patient verbally instructed in the following:  Site selection and skin preparation, Loading strips and lancet device, meter activation, obtaining blood sample, test strip and lancet disposal and recording log book entries  Patient has good understanding of material covered  Comments:  One Touch meter  Testing frequency: Encouraged pair testing  Test sugars before a meal and 2hr after the same meal, rotating between breakfast, lunch, and dinner  Test sugars twice a day (3 days a week, 7 days a week)  Goal Blood Sugars:   Premeal , even better <110  2hr after a meal <180, even better <140  A1C <7%, even better <6 5%  Hypoglycemia: Instructed patient on definition/risk of hypoglycemia, treatment, causes/symptoms, when to notify provider of lows, prevention of hypoglycemia and exercise precautions  Comments: Christine Schmidt understanding of hypoglycemia concepts      Physical Activity: Discussed benefits of physical activity to optimize blood glucose control, encouraged activity at patient is physically able  Always consult a physician prior to starting an exercise program   Comments: Christine Schmidt understanding of hypoglycemia concepts        Diabetes Education Record  Chen Concepcion received the following handouts: class schedule and class assessment packet        Patient response to instruction    Comprehensiongood  Motivationgood  Expected Compliancegood  Response to Teachback: 100%, demonstrated understanding    Start- Stop: 1:00-1::30  Total Minutes: 30 Minutes  Group or Individual Instruction: DSMT-I  Other: Elida Corona MD    Thank you for referring your patient to [facility], it was a pleasure working with them today  Please feel free to call with any questions or concerns      Judson Joselito  [address] [place] [id number]

## 2021-06-30 NOTE — RESULT ENCOUNTER NOTE
Please contact patient with results of stress test   Stress test was negative for ischemia which is decreased oxygenation to the heart  However, the test was limited due to only short duration of exercise tolerance  She needs to follow up with cardiology as we previously discussed  Please give her contact information for [facility]  Also I would like her to complete a pulmonary function study to check her lung function  Please giver her central scheduling contact information for this

## 2021-07-07 DIAGNOSIS — E11.9 TYPE 2 DIABETES MELLITUS WITHOUT COMPLICATION, WITHOUT LONG-TERM CURRENT USE OF INSULIN (HCC): ICD-10-CM

## 2021-07-07 DIAGNOSIS — E11.65 UNCONTROLLED TYPE 2 DIABETES MELLITUS WITH HYPERGLYCEMIA (HCC): ICD-10-CM

## 2021-07-08 ENCOUNTER — TELEMEDICINE (OUTPATIENT)
Dept: DIABETES SERVICES | Facility: CLINIC | Age: 66
End: 2021-07-08
Payer: MEDICARE

## 2021-07-08 DIAGNOSIS — E11.9 TYPE 2 DIABETES MELLITUS WITHOUT COMPLICATION, WITHOUT LONG-TERM CURRENT USE OF INSULIN (HCC): Primary | ICD-10-CM

## 2021-07-08 PROCEDURE — G0109 DIAB MANAGE TRN IND/GROUP: HCPCS | Performed by: DIETITIAN, REGISTERED

## 2021-07-08 RX ORDER — SITAGLIPTIN 100 MG/1
TABLET, FILM COATED ORAL
Qty: 90 TABLET | Refills: 1 | Status: SHIPPED | OUTPATIENT
Start: 2021-07-08 | End: 2022-01-25

## 2021-07-08 RX ORDER — GLIMEPIRIDE 1 MG/1
1 TABLET ORAL
Qty: 90 TABLET | Refills: 1 | Status: SHIPPED | OUTPATIENT
Start: 2021-07-08 | End: 2022-01-25

## 2021-07-10 NOTE — PROGRESS NOTES
[facility]   Beni Espinosa, PharmD, BCPS, BCACP     Ronan Muniz is a 72 y o  female who presents in-person for follow-up  Reason for visit: diabetes  Plan: The following actions were taken today by the Clinical Pharmacist:   1  Continue current medications  2  Reminded patient to obtain labs as ordered     Follow-up:   Follow-up with pharmacist PRN   Next PCP visit: none scheduled  Endocrinology: 7/28/2021    - Home Monitoring Records: SMBGs  - Labs: A1c, CMP, lipid panel, CBC ordered    The following items are to be considered at a future visit:   1  Re-trial of metformin (XR) if necessary  Patient only took 1 dose  Chronic Conditions Addressed at this Visit:   Type 2 diabetes mellitus without complication, without long-term current use of insulin (HCC)  Goals - A1c: <7-7 5%; SMBGs: Preprandial: 80-130mg/dL and Postprandial: <180mg/dL per ADA Guidelines  - Most recent A1c: above goal    - SMBG: below goal with no hypoglycemia  Current DM Regimen:   Glimepiride 1mg QAM   Januvia 100mg daily   MEDICATIONS: Medications are effective and no causing any adverse effects  Anticipate upcoming A1c will be improved   HOME MONITORING: Check blood sugars daily (alternating times) and record  Hyperlipidemia  2018 ACC/AHA blood cholesterol guidelines recommends moderate-intensity statin  Patient tolerating statin well without side effects   MEDICATIONS: atorvastatin 10mg daily  Started in January 2021  Continue as prescribed   LABS: most recent lipid panel reviewed, showing LDL > 100  LFTs WNL Re-check lipid panel  Medication Reconciliation: Medication list reviewed with patient at today's visit  Medication list updated to reflect medications patient is currently taking  - Medication adherence is excellent  TLC:   Dietary habits are fair  Re-enforced the importance of a Diabetic Diet   Exercise habits are poor   Re-enforced the importance of regular aerobic activity as tolerated   BMI Counseling: The BMI is above normal  Nutrition recommendations include consuming healthier snacks  Pino Lamb Tobacco: Continue smoking abstinence  Former smoker  Education:  - Discussed ABCs of diabetes management (A1c/BP/cholesterol) and goals with patient today        Subjective:   DM History:  Microvascular complications: none  Cardiovascular complications: none  - Aspirin (Men>50, Women>60): No  - Statin: Yes   - ACEi/ARB: Not Indicated   Hospitalizations related to DM: no  Completed DM Eduation (Living Well with Diabetes Class): currently taking (July 2021)  Previous DM medications:    Metformin (GI adverse effects)   Jardiance ( candidal infection)    DM Self-Management:  - SMBGs: are performed regularly  She checks BG daily, including FBG and post-prandial readings  She is making effort to rotate times  She  bring blood glucose log today  - Hypoglycemia: (+) awareness; denies any recent episodes  - Knows how to treat hypoglycemia: Yes (juice)   Glucometer: Yes, Brand: OneTouch Verio    Eating habits:  Breakfast: Lunch: Dinner: Snacks: cookies, cake, ice cream   Coffee with sugar substitute, English muff/toast [place], chips meat with veggie and possibly a salad Beverages: flavored water   Barriers to improving diet: likes sweets    Exercise habits: She is not active  Barriers to improving exercise: SOB    Diabetes  She presents for her follow-up diabetic visit  She has type 2 diabetes mellitus  Her disease course has been improving  There are no hypoglycemic associated symptoms  Pertinent negatives for hypoglycemia include no headaches  There are no diabetic associated symptoms  Pertinent negatives for diabetes include no chest pain, no fatigue, no polydipsia, no polyphagia, no polyuria and no weakness  There are no hypoglycemic complications  Symptoms are improving   Risk factors for coronary artery disease include post-menopausal, sedentary lifestyle, stress and diabetes mellitus  Her weight is stable  She is following a generally healthy (Stopped drinking soda, now drinking flavored water) diet  She has not had a previous visit with a dietitian  She never (has WILKES; cannot walk > 1 block ) participates in exercise  Medication Adherence: Responsible for medication management: patient  Medication adherence: taking as prescribed  Patient denies missed doses  Medication Efficacy/Safety:    Medication intolerance/side effects:  Patient was previously taking glimepiride + Januvia together in the morning  States she did experience some hypoglycemia (B, 57)  She is now  admin times (both still in the morning) and reports no low BG readings    Review of Systems  Review of Systems   Constitutional: Negative for fatigue and unexpected weight change  HENT: Positive for hearing loss  Eyes: Negative for visual disturbance  Respiratory: Positive for shortness of breath  Cardiovascular: Positive for leg swelling  Negative for chest pain  Gastrointestinal: Negative for abdominal pain, nausea and vomiting  Endocrine: Negative for polydipsia, polyphagia and polyuria  Musculoskeletal: Positive for arthralgias (knee) and back pain  Skin: Negative for rash  Neurological: Negative for weakness and headaches  Psychiatric/Behavioral: Negative for sleep disturbance       Lifestyle:  Social History     Tobacco Use    Smoking status: Former Smoker     Packs/day: 1 50     Years: 40 00     Pack years: 60 00     Types: Cigarettes     Quit date: 10/26/2012     Years since quittin 7    Smokeless tobacco: Never Used   Vaping Use    Vaping Use: Never used   Substance Use Topics    Alcohol use: Yes     Comment: occasionally    Drug use: Never           Objective:   SMBG readings:   Average Min Max Goal     108 136 80 130 FBG Average: 123 3 mg/dl (108-136 mg/dl), n=4, 0/4= < 70 mg/dl   After-Breakfast  127 157 80 180 After-Breakfast BG Average: 142 mg/dl (127-157 mg/dl), n=2, 0/2= < 70 mg/dl   Pre-Supper  100 100 80 130 Pre-Supper BG Average: 100 mg/dl (100-100 mg/dl), n=1, 0/1= < 70 mg/dl         Total # of reading  < 70 mg/dl: 0/7     Current Outpatient Medications   Medication Instructions    atorvastatin (LIPITOR) 10 mg, Oral, Daily    Besivance 0 6 % SUSP No dose, route, or frequency recorded   Blood Glucose Monitoring Suppl (OneTouch Verio Flex System) w/Device KIT Use as directed    glimepiride (AMARYL) 1 mg, Oral, Daily with breakfast    Januvia 100 MG tablet TAKE 1 TABLET BY MOUTH DAILY    OneTouch Delica Lancets 06Y MISC Other, Daily    OneTouch Verio test strip 1 each, Other, Daily    Prolensa 0 07 % SOLN No dose, route, or frequency recorded  No Known Allergies  Immunization History   Administered Date(s) Administered    INFLUENZA 09/19/2015, 09/06/2018, 10/23/2020    Influenza Quadrivalent Recombinant Preservative Free IM 10/23/2020    Influenza, recombinant, quadrivalent,injectable, preservative free 10/26/2020    SARS-CoV-2 / COVID-19 mRNA IM (Pfizer-BioNTech) 03/31/2021, 04/23/2021    Tdap 09/11/2007     I have reviewed the patient's allergies, medications and history as noted in the electronic medical record and updated as necessary  Vital Signs:  BP Readings from Last 3 Encounters:   06/02/21 130/70   03/26/21 130/80   01/04/21 130/80     Pulse Readings from Last 3 Encounters:   06/02/21 73   03/26/21 72   01/04/21 81      Estimated body mass index is 38 62 kg/m² as calculated from the following:    Height as of 6/2/21: 5' 4" (1 626 m)  Weight as of 6/2/21: 102 kg (225 lb)       Pertinent Lab Data:  Lab Results   Component Value Date    SODIUM 139 01/04/2021    K 4 3 01/04/2021     01/04/2021    CO2 29 01/04/2021    BUN 9 01/04/2021    CREATININE 0 78 01/04/2021    CALCIUM 9 8 01/04/2021     Lab Results   Component Value Date    HGBA1C 10 2 (A) 03/26/2021      Lab Results   Component Value Date    MICROALBCRE 18 10/26/2020    EGFR 80 01/04/2021     Lab Results   Component Value Date    CHOLESTEROL 227 (H) 01/04/2021    TRIG 90 01/04/2021    HDL 71 01/04/2021    LDLCALC 138 (H) 01/04/2021     Lab Results   Component Value Date    ALT 24 01/04/2021    AST 11 01/04/2021    ALKPHOS 155 (H) 01/04/2021      The 10-year ASCVD risk score (Erik Marquez et al , 2013) is: 10 4%    Values used to calculate the score:      Age: 72 years      Sex: Female      Is Non- : No      Diabetic: Yes      Tobacco smoker: No      Systolic Blood Pressure: 471 mmHg      Is BP treated: No      HDL Cholesterol: 71 mg/dL      Total Cholesterol: 227 mg/dL    Preventative Care:  A1c measurement: Overdue  Dilated eye exam: Due soon on: 8/28/2021; 8/2020 - normal  Foot exam: Up to date; 10/26/2020 - no risk  Dental exam: Overdue; patient has no dental insurance  Urinary microalbumin measurement: Up to date; 10/26/2020 - no proteinuria  Health Maintenance Due   Topic Date Due    Pneumococcal Vaccine: 65+ Years (1 of 2 - PPSV23) Never done    HIV Screening  Never done    DTaP,Tdap,and Td Vaccines (2 - Td or Tdap) 09/11/2017    MAMMOGRAM  08/04/2021    DM Eye Exam  08/28/2021    Influenza Vaccine (1) 09/01/2021    HEMOGLOBIN A1C  09/26/2021    Depression Screening PHQ  10/26/2021     Demographics  Interaction Method: Face to Face  Type of Intervention: Follow-Up    Topic(s) Addressed  Diabetes    Tool(s) Used  Not Applicable    Time Spent:   Time Spent in Direct Patient Care: 20 minutes    Time Spent in Care Coordination: 35 minutes    Recommendation(s) Accepted by the Patient/Caregiver: Not Applicable      [place] PharmD  Clinical Integration Pharmacist  [phone]  [phone]  [email or web address]

## 2021-07-12 ENCOUNTER — CLINICAL SUPPORT (OUTPATIENT)
Dept: FAMILY MEDICINE CLINIC | Facility: CLINIC | Age: 66
End: 2021-07-12

## 2021-07-12 DIAGNOSIS — E11.9 TYPE 2 DIABETES MELLITUS WITHOUT COMPLICATION, WITHOUT LONG-TERM CURRENT USE OF INSULIN (HCC): Primary | ICD-10-CM

## 2021-07-12 NOTE — ASSESSMENT & PLAN NOTE
2018 ACC/AHA blood cholesterol guidelines recommends moderate-intensity statin  Patient tolerating statin well without side effects   MEDICATIONS: atorvastatin 10mg daily  Started in January 2021  Continue as prescribed   LABS: most recent lipid panel reviewed, showing LDL > 100  LFTs WNL Re-check lipid panel

## 2021-07-12 NOTE — ASSESSMENT & PLAN NOTE
Goals - A1c: <7-7 5%; SMBGs: Preprandial: 80-130mg/dL and Postprandial: <180mg/dL per ADA Guidelines  - Most recent A1c: above goal    - SMBG: below goal with no hypoglycemia  Current DM Regimen:   Glimepiride 1mg QAM   Januvia 100mg daily   MEDICATIONS: Medications are effective and no causing any adverse effects  Anticipate upcoming A1c will be improved   HOME MONITORING: Check blood sugars daily (alternating times) and record

## 2021-07-13 NOTE — PROGRESS NOTES
Virtual Regular Visit    Verification of patient location:    Patient is currently located in the state [facility]  Patient is currently located in a state in which I am licensed    Assessment/Plan:    Problem List Items Addressed This Visit     None               Reason for visit is   Chief Complaint   Patient presents with    Virtual Regular Visit        Encounter provider Emiliano Lomas RD    Provider located at [address] [place] [phone]      Recent Visits  No visits were found meeting these conditions  Showing recent visits within past 7 days and meeting all other requirements  Future Appointments  No visits were found meeting these conditions  Showing future appointments within next 150 days and meeting all other requirements       The patient was identified by name and date of birth  Hui Best was informed that this is a telemedicine visit and that the visit is being conducted through [employer] and patient was informed that this is a secure, HIPAA-compliant platform  She agrees to proceed     My office door was closed  No one else was in the room  She acknowledged consent and understanding of privacy and security of the video platform  The patient has agreed to participate and understands they can discontinue the visit at any time  Patient is aware this is a billable service  Living Well with Diabetes Group Class #1    Carletta Hammans Hallum attended the Living Well with Diabetes Group Class #1  Topics Covered in class today include: What is diabetes; Types of Diabetes; How Diabetes is diagnosed; Management skills; the role of exercise in blood sugar managements, Home glucose monitoring, and target ranges  Beryl Castillo participated in group activities    The patient's history was reviewed and updated as appropriate: allergies, current medications      Lab Results   Component Value Date    HGBA1C 10 2 (A) 03/26/2021 Diabetes Education Record  Ruddy Rosales was provided Living Well with Diabetes Class #1 book- sent via email      Patient response to instruction    Comprehensiongood  Motivationgood  Expected Compliancegood    Begin Time: 6pm  End Time: 8pm  Referring Provider: Danay Mcintosh    Thank you for referring your patient to [facility], it was a pleasure working with them today  Please feel free to call with any questions or concerns  Joni BlairesLEELA  [phone]      Ana M Henry is a 72 y o  female see notes above   HPI     Past Medical History:   Diagnosis Date    Varicella        Past Surgical History:   Procedure Laterality Date    NO PAST SURGERIES         Current Outpatient Medications   Medication Sig Dispense Refill    atorvastatin (LIPITOR) 10 mg tablet Take 1 tablet (10 mg total) by mouth daily (Patient not taking: Reported on 6/2/2021) 30 tablet 5    Besivance 0 6 % SUSP       Blood Glucose Monitoring Suppl (OneTouch Verio Flex System) w/Device KIT Use as directed      glimepiride (AMARYL) 1 mg tablet Take 1 tablet (1 mg total) by mouth daily with breakfast 90 tablet 1    Januvia 100 MG tablet TAKE 1 TABLET BY MOUTH DAILY 90 tablet 1    OneTouch Delica Lancets 78J MISC Use daily 100 each 1    OneTouch Verio test strip Use 1 each daily 100 each 1    Prolensa 0 07 % SOLN        No current facility-administered medications for this visit  No Known Allergies    Review of Systems    Video Exam    There were no vitals filed for this visit  Physical Exam     I spent 120 minutes with patient today in which greater than 50% of the time was spent in counseling/coordination of care regarding diabetes    VIRTUAL VISIT [facility] verbally agrees to participate in [employer]   Pt is aware that [employer] could be limited without vital signs or the ability to perform a full hands-on physical exam  Carey Mcdaniel understands she or the provider may request at any time to terminate the video visit and request the patient to seek care or treatment in person

## 2021-09-03 ENCOUNTER — OFFICE VISIT (OUTPATIENT)
Dept: CARDIOLOGY CLINIC | Facility: CLINIC | Age: 66
End: 2021-09-03
Payer: MEDICARE

## 2021-09-03 VITALS
SYSTOLIC BLOOD PRESSURE: 144 MMHG | DIASTOLIC BLOOD PRESSURE: 86 MMHG | BODY MASS INDEX: 40.48 KG/M2 | WEIGHT: 237.1 LBS | OXYGEN SATURATION: 100 % | HEART RATE: 73 BPM | HEIGHT: 64 IN

## 2021-09-03 DIAGNOSIS — E66.01 CLASS 2 SEVERE OBESITY DUE TO EXCESS CALORIES WITH SERIOUS COMORBIDITY AND BODY MASS INDEX (BMI) OF 38.0 TO 38.9 IN ADULT (HCC): ICD-10-CM

## 2021-09-03 DIAGNOSIS — R06.00 DYSPNEA ON EXERTION: Primary | ICD-10-CM

## 2021-09-03 DIAGNOSIS — E78.2 MIXED HYPERLIPIDEMIA: ICD-10-CM

## 2021-09-03 PROBLEM — R06.09 DOE (DYSPNEA ON EXERTION): Status: RESOLVED | Noted: 2020-08-25 | Resolved: 2021-09-03

## 2021-09-03 PROCEDURE — 99204 OFFICE O/P NEW MOD 45 MIN: CPT | Performed by: INTERNAL MEDICINE

## 2021-09-03 PROCEDURE — 93000 ELECTROCARDIOGRAM COMPLETE: CPT | Performed by: INTERNAL MEDICINE

## 2021-09-03 RX ORDER — ISOSORBIDE MONONITRATE 30 MG/1
30 TABLET, EXTENDED RELEASE ORAL DAILY
Qty: 30 TABLET | Refills: 11 | Status: SHIPPED | OUTPATIENT
Start: 2021-09-03

## 2021-09-03 RX ORDER — METOPROLOL TARTRATE 50 MG/1
50 TABLET, FILM COATED ORAL
Qty: 2 TABLET | Refills: 0 | Status: SHIPPED | OUTPATIENT
Start: 2021-09-03 | End: 2021-09-03

## 2021-09-03 RX ORDER — METOPROLOL TARTRATE 50 MG/1
50 TABLET, FILM COATED ORAL ONCE
Qty: 2 TABLET | Refills: 0 | Status: SHIPPED | OUTPATIENT
Start: 2021-09-03 | End: 2021-09-03

## 2021-09-03 NOTE — ASSESSMENT & PLAN NOTE
Exertional dyspnea, possibly multifactorial   Recent stress test limited by significant dyspnea and wheezing which could be secondary to underlying COPD  No ischemic changes on EKG but functional capacity markedly reviewed  Non diagnostic stress test   She reports some burning pain in the chest on exertion which may be angina equivalent  Patient is an intermediate risk for coronary artery disease  Further risk stratification with a coronary artery CTA has been requested  If CTA shows low risk findings, continued risk factor modification will be indicated  If CT shows significant obstructive coronary disease, then coronary angiography will be offered  Metoprolol 50 mg to be taken 2 hours prior to coronary artery CTA  Empiric trial of Imdur 30 mg a day, to see if this helps her angina      The 10-year ASCVD risk score (Virgin Meckel , et al , 2013) is: 10 4%    Values used to calculate the score:      Age: 72 years      Sex: Female      Is Non- : No      Diabetic: Yes      Tobacco smoker: No      Systolic Blood Pressure: 694 mmHg      Is BP treated: No      HDL Cholesterol: 71 mg/dL      Total Cholesterol: 227 mg/dL

## 2021-09-03 NOTE — PROGRESS NOTES
[facility] [place]  [address]  [address]  [facility] [address]  Phone#  [phone]  Fax#  [phone]  [facility]'s Cardiology Office Consultation             NAME: Michael Mcdaniel  AGE: 72 y o  SEX: female   : 1955   MRN: [id number]    DATE: 9/3/2021  TIME: 10:29 AM    Assessment and plan:    1  Dyspnea on exertion  Assessment & Plan:    Exertional dyspnea, possibly multifactorial   Recent stress test limited by significant dyspnea and wheezing which could be secondary to underlying COPD  No ischemic changes on EKG but functional capacity markedly reviewed  Non diagnostic stress test   She reports some burning pain in the chest on exertion which may be angina equivalent  Patient is an intermediate risk for coronary artery disease  Further risk stratification with a coronary artery CTA has been requested  If CTA shows low risk findings, continued risk factor modification will be indicated  If CT shows significant obstructive coronary disease, then coronary angiography will be offered  Metoprolol 50 mg to be taken 2 hours prior to coronary artery CTA  Empiric trial of Imdur 30 mg a day, to see if this helps her angina  The 10-year ASCVD risk score (Marjorie Oswald et al , 2013) is: 10 4%    Values used to calculate the score:      Age: 72 years      Sex: Female      Is Non- : No      Diabetic: Yes      Tobacco smoker: No      Systolic Blood Pressure: 769 mmHg      Is BP treated: No      HDL Cholesterol: 71 mg/dL      Total Cholesterol: 227 mg/dL      Orders:  -     Ambulatory referral to Cardiology  -     POCT ECG  -     CTA cardiac; Future; Expected date: 2021  -     Basic metabolic panel; Future; Expected date: 2021  -     NT-BNP PRO; Future; Expected date: 2021  -     metoprolol tartrate (LOPRESSOR) 50 mg tablet;  Take 1 tablet (50 mg total) by mouth once for 1 dose Take 3 hours prior to CTA  -     isosorbide mononitrate (IMDUR) 30 mg 24 hr tablet; Take 1 tablet (30 mg total) by mouth daily    2  Class 2 severe obesity due to excess calories with serious comorbidity and body mass index (BMI) of 38 0 to 38 9 in adult [facility])  Assessment & Plan:  Reports over a 50 lb weight gain over the last 1 year related to sedentary lifestyle and poor dietary choices  The patient is asked to make an attempt to improve diet and exercise patterns to aid in medical management of this problem  Continue efforts at weight loss including diet modification, increased physical activity and avoidance of calorie dense foods  Mediterranean style diet  3  Mixed hyperlipidemia  Assessment & Plan:  Lab Results   Component Value Date    LDLCALC 138 (H) 01/04/2021       Had been started on statins , but was not taking them  I advised her to resume her statins and follow up with Ms Angella Yee  Discussion: in light of her progressive dyspnea and atypical angina and recent abnormal stress test which was technically difficult and nondiagnostic, further ischemic evaluation is indicated  We have elected to perform a coronary artery CTA to rule out significant obstructive coronary artery disease  Aggressive risk factor modification planned  Chief Complaint   Patient presents with   [facility] Patient Visit     establishing cardiac care referred by pcp    Palpitations     exertion, patient feels more of a burning feeling     Shortness of Breath     exertion     Dizziness     changing positions quickly     Ankle Swelling     both     Leg Swelling     both        HPI:    Nelia Alberto is a 72y o -year-old female who presents to the cardiology clinic for evaluation  She has been referred here for evaluation of shortness of breath which is rather chronic in nature  She has a  history of cigarette smoking, COPD, diabetes, dyslipidemia, referred for evaluation of exertional dyspnea    She recently (in 6/21) underwent a stress ECHO which showed no ischemic changes but markedly reduced functional capacity at 3 minutes, limited by severe dyspnea and wheezing  Stress echo was technically difficult  No definite segmental wall motion abnormalities were noted , no ischemic changes on EKG  Recent CT of the chest personally reviewed showed mild aortic calcification  Coronaries were poorly visualized  Unable to appreciate any definite coronary calcification  Current symptoms:   She reports exertional palpitations and some burning in her chest   She reports shortness of breath on exertion  If she gets up too fast from the bed, she gets dizzy but has not had syncope  She reports mild ankle swelling that progresses as the day goes by  No prior history of coronary artery disease or heart failure  She quit smoking in 2012  She was a heavy smoker before that with a 60 pack-year smoking history  She also reports some burning discomfort in her chest only on exertion  She states she has gained over 50 lb over the last 1 year and specifically about 20 lb in the last 6 months related to poor dietary choices and sedentary lifestyle  She attributes most of her symptoms to weight gain  She was placed on statins for dyslipidemia but stopped taking them on her own  Past history, family history, social history, current medications, vital signs, recent lab and imaging studies and  prior cardiology studies reviewed independently on this visit  Cardiology Problem list:  Dyspnea:  6/21:  Stress test -symptomatic at 3 minutes, EF normal, limited by significant dyspnea and wheezing    Stress echo was technically difficult but no definite wall motion abnormality seen at peak stress  Former  60 pack year smoker:  Quit 10/2012  Dyslipidemia :  Stopped statins on her own  Obesity, gained 50 lb in 1 year    BP Readings from Last 4 Encounters:   09/03/21 144/86   06/02/21 130/70   03/26/21 130/80   01/04/21 130/80      Wt Readings from Last 3 Encounters:   09/03/21 108 kg (237 lb 1 6 oz)   06/02/21 102 kg (225 lb)   03/26/21 101 kg (222 lb 3 2 oz)       Lab Results   Component Value Date    LDLCALC 138 (H) 01/04/2021     Lab Results   Component Value Date    CREATININE 0 78 01/04/2021      Lab Results   Component Value Date    [id number] 1 820 01/04/2021         ALLERGIES:  No Known Allergies      Current Outpatient Medications:     Blood Glucose Monitoring Suppl (OneTouch Verio Flex System) w/Device KIT, Use as directed, Disp: , Rfl:     glimepiride (AMARYL) 1 mg tablet, Take 1 tablet (1 mg total) by mouth daily with breakfast, Disp: 90 tablet, Rfl: 1    Januvia 100 MG tablet, TAKE 1 TABLET BY MOUTH DAILY, Disp: 90 tablet, Rfl: 1    OneTouch Delica Lancets 69H MISC, Use daily, Disp: 100 each, Rfl: 1    OneTouch Verio test strip, Use 1 each daily, Disp: 100 each, Rfl: 1    atorvastatin (LIPITOR) 10 mg tablet, Take 1 tablet (10 mg total) by mouth daily (Patient not taking: Reported on 6/2/2021), Disp: 30 tablet, Rfl: 5    Besivance 0 6 % SUSP, , Disp: , Rfl:     isosorbide mononitrate (IMDUR) 30 mg 24 hr tablet, Take 1 tablet (30 mg total) by mouth daily, Disp: 30 tablet, Rfl: 11    metoprolol tartrate (LOPRESSOR) 50 mg tablet, Take 1 tablet (50 mg total) by mouth once for 1 dose Take 3 hours prior to CTA, Disp: 2 tablet, Rfl: 0    Prolensa 0 07 % SOLN, , Disp: , Rfl:       Review of Systems   Constitutional: Negative for activity change, appetite change and unexpected weight change  HENT: Negative for trouble swallowing  Eyes: Negative for visual disturbance  Respiratory: Positive for shortness of breath  Negative for chest tightness and wheezing  Cardiovascular: Positive for palpitations and leg swelling  Negative for chest pain  Gastrointestinal: Negative for blood in stool  Endocrine: Negative for polyuria  Elevated glucose and dyslipidemia   Genitourinary: Negative for hematuria  Musculoskeletal: Positive for arthralgias     Skin: Negative for rash  Neurological: Positive for dizziness  Negative for weakness  Hematological: Does not bruise/bleed easily  Psychiatric/Behavioral: Negative for behavioral problems  Past Medical History:   Diagnosis Date    Varicella        Past Surgical History:   Procedure Laterality Date    NO PAST SURGERIES         Family History   Problem Relation Age of Onset    Diabetes Mother    [address] Hyperlipidemia Mother     Hypertension Mother     Atrial fibrillation Mother     Breast cancer Sister     Coronary artery disease Brother         3 stents    Hypertension Brother     Hyperlipidemia Brother     Thyroid cancer Daughter     Lung cancer Maternal Grandfather     Cervical cancer Cousin     Diabetes Father        Social History     Socioeconomic History    Marital status:      Spouse name: Not on file    Number of children: Not on file    Years of education: Not on file    Highest education level: Not on file   Occupational History    Not on file   Tobacco Use    Smoking status: Former Smoker     Packs/day: 1 50     Years: 40 00     Pack years: 60 00     Types: Cigarettes     Quit date: 10/26/2012     Years since quittin 8    Smokeless tobacco: Never Used   Vaping Use    Vaping Use: Never used   Substance and Sexual Activity    Alcohol use: Yes     Comment: occasionally    Drug use: Never    Sexual activity: Not on file   Other Topics Concern    Not on file   Social History Narrative        1 daughter    Lives with her daughter and mother    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     Social Determinants of Health     Financial Resource Strain: Low Risk     Difficulty of Paying Living Expenses: Not hard at all   Food Insecurity: No Food Insecurity    Worried About Running Out of Food in the Last Year: Never true    [address] in the Last Year: Never true   Transportation Needs: No Transportation Needs    Lack of Transportation (Medical):  No    Lack of Transportation (Non-Medical): No   Physical Activity: Inactive    Days of Exercise per Week: 0 days    Minutes of Exercise per Session: 0 min   Stress: Stress Concern Present    Feeling of Stress : Rather much   Social Connections:     Frequency of Communication with Friends and Family:     Frequency of Social Gatherings with Friends and Family:     Attends Ten[employer] Services:     Active Member of Clubs or Organizations:     Attends Club or Organization Meetings:     Marital Status:    Intimate Partner Violence:     Fear of Current or Ex-Partner:     Emotionally Abused:     Physically Abused:     Sexually Abused:          Objective:     Vitals:    21 0951   BP: 144/86   Pulse: 73   SpO2: 100%     Wt Readings from Last 3 Encounters:   21 108 kg (237 lb 1 6 oz)   21 102 kg (225 lb)   21 101 kg (222 lb 3 2 oz)     Pulse Readings from Last 3 Encounters:   21 73   21 73   21 72     BP Readings from Last 3 Encounters:   21 144/86   21 130/70   21 130/80         Physical Exam  Constitutional:       General: She is not in acute distress  Appearance: She is obese  HENT:      Head: Normocephalic  Mouth/Throat:      Mouth: Mucous membranes are moist    Eyes:      Conjunctiva/sclera: Conjunctivae normal    Neck:      Vascular: No carotid bruit  Cardiovascular:      Rate and Rhythm: Normal rate and regular rhythm  Heart sounds: S1 normal and S2 normal  No murmur heard  No systolic murmur is present  Pulmonary:      Breath sounds: Decreased breath sounds present  No wheezing or rales  Abdominal:      General: Bowel sounds are normal  There is no distension  Musculoskeletal:      Right lower le+ Pitting Edema present  Left lower le+ Pitting Edema present  Skin:     General: Skin is warm  Findings: No lesion  Neurological:      General: No focal deficit present  Mental Status: She is alert     Psychiatric:         Mood and Affect: Mood normal          Pertinent Laboratory/Diagnostic Studies:    Laboratory studies reviewed personally by Edgar Soler MD    BMP:   Lab Results   Component Value Date    SODIUM 139 01/04/2021    K 4 3 01/04/2021     01/04/2021    CO2 29 01/04/2021    BUN 9 01/04/2021    CREATININE 0 78 01/04/2021    CALCIUM 9 8 01/04/2021     CBC:  Lab Results   Component Value Date    WBC 10 25 (H) 01/04/2021    RBC 4 59 01/04/2021    HGB 12 6 01/04/2021    HCT 41 4 01/04/2021    MCV 90 01/04/2021    MCH 27 5 01/04/2021    RDW 12 9 01/04/2021     01/04/2021     Coags:    Lipid Profile:   No results found for: CHOL  Lab Results   Component Value Date    HDL 71 01/04/2021     Lab Results   Component Value Date    LDLCALC 138 (H) 01/04/2021     Lab Results   Component Value Date    TRIG 90 01/04/2021      Lab Results   Component Value Date    [id number] 1 820 01/04/2021     Lab Results   Component Value Date    ALT 24 01/04/2021    AST 11 01/04/2021         Imaging Studies:   No results found  Cardiac testing:   EKG reviewed personally: normal EKG, normal sinus rhythm  No results found for this or any previous visit  Orders Placed This Encounter   Procedures    CTA cardiac    Basic metabolic panel    NT-BNP PRO    POCT ECG           Efrain Mathew MD, [facility]    Portions of the record may have been created with voice recognition software  Occasional wrong word or "sound a like" substitutions may have occurred due to the inherent limitations of voice recognition software  Read the chart carefully and recognize, using context, where substitutions have occurred  If you have any questions or concerns about the context, text or information contained within the body of this dictation, please contact myself, the provider, for further clarification

## 2021-09-03 NOTE — PATIENT INSTRUCTIONS
Further risk stratification with a coronary artery CTA has been requested  Metoprolol 50 mg to be taken 3 hours prior to coronary artery CTA  Imdur 30m tablet orally every morning to help with symptoms of chest pain  This may cause headaches for a few days and you can take tylenol for that  Get labs: BNP and BMP 2 weeks from now (ordered)        [address] diet: A heart-healthy eating plan    What is the Mediterranean diet? The Mediterranean diet is a way of eating based on the traditional cuisine of countries bordering [facility]  While there is no single definition of the Mediterranean diet, it is typically high in vegetables, fruits, whole grains, beans, nut and seeds, and olive oil  The main components of Mediterranean diet include:    Daily consumption of vegetables, fruits, whole grains and healthy fats  Weekly intake of fish, poultry, beans and eggs  Moderate portions of dairy products  Limited intake of red meat  Other important elements of the Mediterranean diet are sharing meals with family and friends    Plant based, not meat based  The foundation of the Mediterranean diet is vegetables, fruits, herbs, nuts, beans and whole grains  Meals are built around these plant-based foods  Small amounts of dairy, poultry and eggs are also central to the Mediterranean Diet, as is seafood  In contrast, red meat is eaten only occasionally  Healthy fats  Healthy fats are a mainstay of the Mediterranean diet  They're eaten instead of less healthy fats, such as saturated and trans fats, which contribute to heart disease  Olive oil is the primary source of added fat in the Mediterranean diet  Olive oil provides monounsaturated fat, which has been found to lower total cholesterol and low-density lipoprotein (LDL or "bad") cholesterol levels  Nuts and seeds also contain monounsaturated fat  Fish are also important in the [address]   Fatty fish -- such as mackerel, herring, sardines, albacore tuna, salmon and lake trout -- are rich in omega-3 fatty acids, a type of polyunsaturated fat that may reduce inflammation in the body  Omega-3 fatty acids also help decrease triglycerides, reduce blood clotting, and decrease the risk of stroke and heart failure  Eating the [address]  Interested in trying the [address]? These tips will help you get started:    Eat more fruits and vegetables  Aim for 7 to 10 servings a day of fruit and vegetables  Opt for whole grains  Switch to whole-grain bread, cereal and pasta  [place] with other whole grains  Use healthy fats  Try olive oil as a replacement for butter when cooking  Instead of putting butter or margarine on bread, try dipping it in flavored olive oil  Eat more seafood  Eat fish twice a week  Fresh or water-packed tuna, salmon, trout, mackerel and herring are healthy choices  Grilled fish tastes good and requires little cleanup  Avoid deep-fried fish  Reduce red meat  Substitute fish, poultry or beans for meat  If you eat meat, make sure it's lean and keep portions small  Spice it up  Herbs and spices boost flavor and lessen the need for salt  The Mediterranean diet is a delicious and healthy way to eat  Many people who switch to this style of eating say they'll never eat any other way  Source: http://www [employer] org/      Mediterranean Diet   AMBULATORY CARE:   A Mediterranean diet  is a meal plan that includes foods that are commonly eaten in countries that border the [facility]  This meal plan may provide several health benefits  These include losing or maintaining weight, and decreasing blood pressure, blood sugar, and cholesterol levels  It may also help protect against certain health conditions such as heart disease, cancer, type 2 diabetes, and Alzheimer disease   Work with a dietitian to develop a meal plan that is right for you  Foods to include in the [address] diet:   · Include fruits and vegetables in each meal   Eat a variety of fresh fruits and vegetables  · Choose whole grains every day  These foods include whole-grain breads, pastas, and cereals  It also includes brown rice, quinoa, and millet  · Use unsaturated fats instead of saturated fats  Cook with olive or canola oil  Limit saturated fats, such as butter, margarine, and shortening  Saturated fat is an unhealthy fat that can increase your cholesterol levels  · Choose plant foods, poultry, and fish as your main sources of protein  ? Eat plant-based foods that provide protein,  such as lentils, beans, chickpeas, nuts, and seeds  Choose mostly plant-based foods in place of meat on most days of the week  ? Eat protein foods high in omega-3 fats  Fish high in omega-3 fats include salmon, trout, and tuna  Include these types of fish 1 or 2 times each week  Limit fish high in mercury, such as shark, swordfish, tilefish, and sue mackerel  Omega-3 fats are also found in walnuts and flaxseed  ? Choose poultry (chicken or turkey)  without skin instead of red meat  Red meat is high in saturated fat  Limit eggs and high-fat meats, such as franco, sausage, and hot dogs  · Choose low-fat dairy foods  such as nonfat or 1% milk, or low-fat almond, cashew, or soy milk  Other examples include low-fat cheese, yogurt, and cottage cheese  · Limit sweets  Limit your intake of high-sugar foods, such as soda, desserts, and candy  · Talk to your healthcare provider about alcohol  Studies have shown that moderate intake of wine may reduce the risk of heart disease  A moderate amount of wine is 1 serving for women and men 65 years and older each day  Two servings is recommended for men 24to 59years of age each day  A serving of wine is 5 ounces      Other things you need to know if you follow the Mediterranean diet: · Include foods high in iron and vitamin C   Plant-based foods that are high in iron include spinach, beans, tofu, and artichoke  Eat a serving of vitamin C with any iron-rich food to help your body absorb more iron  Examples include oranges, strawberries, cantaloupe, broccoli, and yellow peppers  · Get regular physical activity  The Mediterranean diet will have the most benefit if you get regular physical activity  Get 30 minutes of physical activity at least 5 days a week  Choose physical activities that increase your heart rate  Examples include walking, hiking, swimming, and riding a bike  Ask your healthcare provider about the best exercise plan for you  © Copyright [employer] 2021 Information is for End User's use only and may not be sold, redistributed or otherwise used for commercial purposes  All illustrations and images included in CareNotes® are the copyrighted property of [employer]  or [facility] Elif Wolf   The above information is an  only  It is not intended as medical advice for individual conditions or treatments  Talk to your doctor, nurse or pharmacist before following any medical regimen to see if it is safe and effective for you

## 2021-09-03 NOTE — ASSESSMENT & PLAN NOTE
Reports over a 50 lb weight gain over the last 1 year related to sedentary lifestyle and poor dietary choices  The patient is asked to make an attempt to improve diet and exercise patterns to aid in medical management of this problem  Continue efforts at weight loss including diet modification, increased physical activity and avoidance of calorie dense foods  Mediterranean style diet

## 2021-09-03 NOTE — ASSESSMENT & PLAN NOTE
Lab Results   Component Value Date    LDLCALC 138 (H) 01/04/2021       Had been started on statins , but was not taking them  I advised her to resume her statins and follow up with Ms Po Driver

## 2021-09-03 NOTE — LETTER
September 3, 2021     Satira Schaumann, CRNP  [address] [place] [address]    Patient: Ino Sheth   YOB: 1955   Date of Visit: 9/3/2021       Dear Rosalba Che:    Thank you for referring Heidi Piña to me for evaluation  Below are my notes for this consultation  If you have questions, please do not hesitate to call me  I look forward to following your patient along with you  Sincerely,      Yannick Lopez MD        CC: No Recipients  Ortiz Lopez MD  9/3/2021 10:29 AM  Incomplete  [facility] [place]  [address] [facility] [address]  [address]  [facility] [address]  Phone#  [phone]  Fax#  [phone]  [facility]'s Cardiology Office Consultation             NAME: Sharda Mcdaniel  AGE: 72 y o  SEX: female   : 1955   MRN: [id number]    DATE: 9/3/2021  TIME: 10:29 AM    Assessment and plan:    1  Dyspnea on exertion  Assessment & Plan:    Exertional dyspnea, possibly multifactorial   Recent stress test limited by significant dyspnea and wheezing which could be secondary to underlying COPD  No ischemic changes on EKG but functional capacity markedly reviewed  Non diagnostic stress test   She reports some burning pain in the chest on exertion which may be angina equivalent  Patient is an intermediate risk for coronary artery disease  Further risk stratification with a coronary artery CTA has been requested  If CTA shows low risk findings, continued risk factor modification will be indicated  If CT shows significant obstructive coronary disease, then coronary angiography will be offered  Metoprolol 50 mg to be taken 2 hours prior to coronary artery CTA  Empiric trial of Imdur 30 mg a day, to see if this helps her angina      The 10-year ASCVD risk score (Mattie Vora , et al , 2013) is: 10 4%    Values used to calculate the score:      Age: 72 years      Sex: Female      Is Non- : No      Diabetic: Yes      Tobacco smoker: No Systolic Blood Pressure: 489 mmHg      Is BP treated: No      HDL Cholesterol: 71 mg/dL      Total Cholesterol: 227 mg/dL      Orders:  -     Ambulatory referral to Cardiology  -     POCT ECG  -     CTA cardiac; Future; Expected date: 09/03/2021  -     Basic metabolic panel; Future; Expected date: 09/17/2021  -     NT-BNP PRO; Future; Expected date: 09/17/2021  -     metoprolol tartrate (LOPRESSOR) 50 mg tablet; Take 1 tablet (50 mg total) by mouth once for 1 dose Take 3 hours prior to CTA  -     isosorbide mononitrate (IMDUR) 30 mg 24 hr tablet; Take 1 tablet (30 mg total) by mouth daily    2  Class 2 severe obesity due to excess calories with serious comorbidity and body mass index (BMI) of 38 0 to 38 9 in adult [facility])  Assessment & Plan:  Reports over a 50 lb weight gain over the last 1 year related to sedentary lifestyle and poor dietary choices  The patient is asked to make an attempt to improve diet and exercise patterns to aid in medical management of this problem  Continue efforts at weight loss including diet modification, increased physical activity and avoidance of calorie dense foods  Mediterranean style diet  3  Mixed hyperlipidemia  Assessment & Plan:  Lab Results   Component Value Date    LDLCALC 138 (H) 01/04/2021       Had been started on statins , but was not taking them  I advised her to resume her statins and follow up with Ms  Zacarias Vora  Discussion: in light of her progressive dyspnea and atypical angina and recent abnormal stress test which was technically difficult and nondiagnostic, further ischemic evaluation is indicated  We have elected to perform a coronary artery CTA to rule out significant obstructive coronary artery disease  Aggressive risk factor modification planned      Chief Complaint   Patient presents with    New Patient Visit     establishing cardiac care referred by pcp    Palpitations     exertion, patient feels more of a burning feeling     Shortness of Breath     exertion     Dizziness     changing positions quickly     Ankle Swelling     both     Leg Swelling     both        HPI:    Floyde Cabot is a 72y o -year-old female who presents to the cardiology clinic for evaluation  She has been referred here for evaluation of shortness of breath which is rather chronic in nature  She has a  history of cigarette smoking, COPD, diabetes, dyslipidemia, referred for evaluation of exertional dyspnea  She recently (in 6/21) underwent a stress ECHO which showed no ischemic changes but markedly reduced functional capacity at 3 minutes, limited by severe dyspnea and wheezing  Stress echo was technically difficult  No definite segmental wall motion abnormalities were noted , no ischemic changes on EKG  Recent CT of the chest personally reviewed showed mild aortic calcification  Coronaries were poorly visualized  Unable to appreciate any definite coronary calcification  Current symptoms:   She reports exertional palpitations and some burning in her chest   She reports shortness of breath on exertion  If she gets up too fast from the bed, she gets dizzy but has not had syncope  She reports mild ankle swelling that progresses as the day goes by  No prior history of coronary artery disease or heart failure  She quit smoking in 2012  She was a heavy smoker before that with a 60 pack-year smoking history  She also reports some burning discomfort in her chest only on exertion  She states she has gained over 50 lb over the last 1 year and specifically about 20 lb in the last 6 months related to poor dietary choices and sedentary lifestyle  She attributes most of her symptoms to weight gain  She was placed on statins for dyslipidemia but stopped taking them on her own      Past history, family history, social history, current medications, vital signs, recent lab and imaging studies and  prior cardiology studies reviewed independently on this visit  Cardiology Problem list:  Dyspnea:  6/21:  Stress test -symptomatic at 3 minutes, EF normal, limited by significant dyspnea and wheezing    Stress echo was technically difficult but no definite wall motion abnormality seen at peak stress  Former  60 pack year smoker:  Quit 10/2012  Dyslipidemia :  Stopped statins on her own  Obesity, gained 50 lb in 1 year    BP Readings from Last 4 Encounters:   09/03/21 144/86   06/02/21 130/70   03/26/21 130/80   01/04/21 130/80      Wt Readings from Last 3 Encounters:   09/03/21 108 kg (237 lb 1 6 oz)   06/02/21 102 kg (225 lb)   03/26/21 101 kg (222 lb 3 2 oz)       Lab Results   Component Value Date    LDLCALC 138 (H) 01/04/2021     Lab Results   Component Value Date    CREATININE 0 78 01/04/2021      Lab Results   Component Value Date    [id number] 1 820 01/04/2021         ALLERGIES:  No Known Allergies      Current Outpatient Medications:     Blood Glucose Monitoring Suppl (OneTouch Verio Flex System) w/Device KIT, Use as directed, Disp: , Rfl:     glimepiride (AMARYL) 1 mg tablet, Take 1 tablet (1 mg total) by mouth daily with breakfast, Disp: 90 tablet, Rfl: 1    Januvia 100 MG tablet, TAKE 1 TABLET BY MOUTH DAILY, Disp: 90 tablet, Rfl: 1    OneTouch Delica Lancets 58G MISC, Use daily, Disp: 100 each, Rfl: 1    OneTouch Verio test strip, Use 1 each daily, Disp: 100 each, Rfl: 1    atorvastatin (LIPITOR) 10 mg tablet, Take 1 tablet (10 mg total) by mouth daily (Patient not taking: Reported on 6/2/2021), Disp: 30 tablet, Rfl: 5    Besivance 0 6 % SUSP, , Disp: , Rfl:     isosorbide mononitrate (IMDUR) 30 mg 24 hr tablet, Take 1 tablet (30 mg total) by mouth daily, Disp: 30 tablet, Rfl: 11    metoprolol tartrate (LOPRESSOR) 50 mg tablet, Take 1 tablet (50 mg total) by mouth once for 1 dose Take 3 hours prior to CTA, Disp: 2 tablet, Rfl: 0    Prolensa 0 07 % SOLN, , Disp: , Rfl:       Review of Systems   Constitutional: Negative for activity change, appetite change and unexpected weight change  HENT: Negative for trouble swallowing  Eyes: Negative for visual disturbance  Respiratory: Positive for shortness of breath  Negative for chest tightness and wheezing  Cardiovascular: Positive for palpitations and leg swelling  Negative for chest pain  Gastrointestinal: Negative for blood in stool  Endocrine: Negative for polyuria  Elevated glucose and dyslipidemia   Genitourinary: Negative for hematuria  Musculoskeletal: Positive for arthralgias  Skin: Negative for rash  Neurological: Positive for dizziness  Negative for weakness  Hematological: Does not bruise/bleed easily  Psychiatric/Behavioral: Negative for behavioral problems         Past Medical History:   Diagnosis Date    Varicella        Past Surgical History:   Procedure Laterality Date    NO PAST SURGERIES         Family History   Problem Relation Age of Onset    Diabetes Mother     Hyperlipidemia Mother     Hypertension Mother     Atrial fibrillation Mother     Breast cancer Sister     Coronary artery disease Brother         3 stents    Hypertension Brother     Hyperlipidemia Brother     Thyroid cancer Daughter     Lung cancer Maternal Grandfather     Cervical cancer Cousin     Diabetes Father        Social History     Socioeconomic History    Marital status:      Spouse name: Not on file    Number of children: Not on file    Years of education: Not on file    Highest education level: Not on file   Occupational History    Not on file   Tobacco Use    Smoking status: Former Smoker     Packs/day: 1 50     Years: 40 00     Pack years: 60 00     Types: Cigarettes     Quit date: 10/26/2012     Years since quittin 8    Smokeless tobacco: Never Used   Vaping Use    Vaping Use: Never used   Substance and Sexual Activity    Alcohol use: Yes     Comment: occasionally    Drug use: Never    Sexual activity: Not on file   Other Topics Concern    Not on file   Social History Narrative        1 daughter    Lives with her daughter and mother    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     Social Determinants of Health     Financial Resource Strain: Low Risk     Difficulty of Paying Living Expenses: Not hard at all   Food Insecurity: No Food Insecurity    Worried About Running Out of Food in the Last Year: Never true    [address] in the Last Year: Never true   Transportation Needs: No Transportation Needs    Lack of Transportation (Medical): No    Lack of Transportation (Non-Medical): No   Physical Activity: Inactive    Days of Exercise per Week: 0 days    Minutes of Exercise per Session: 0 min   Stress: Stress Concern Present    Feeling of Stress : Rather much   Social Connections:     Frequency of Communication with Friends and Family:     Frequency of Social Gatherings with Friends and Family:     Attends Anglican Services:     Active Member of Clubs or Organizations:     Attends Club or Organization Meetings:     Marital Status:    Intimate Partner Violence:     Fear of Current or Ex-Partner:     Emotionally Abused:     Physically Abused:     Sexually Abused:          Objective:     Vitals:    09/03/21 0951   BP: 144/86   Pulse: 73   SpO2: 100%     Wt Readings from Last 3 Encounters:   09/03/21 108 kg (237 lb 1 6 oz)   06/02/21 102 kg (225 lb)   03/26/21 101 kg (222 lb 3 2 oz)     Pulse Readings from Last 3 Encounters:   09/03/21 73   06/02/21 73   03/26/21 72     BP Readings from Last 3 Encounters:   09/03/21 144/86   06/02/21 130/70   03/26/21 130/80         Physical Exam  Constitutional:       General: She is not in acute distress  Appearance: She is obese  HENT:      Head: Normocephalic  Mouth/Throat:      Mouth: Mucous membranes are moist    Eyes:      Conjunctiva/sclera: Conjunctivae normal    Neck:      Vascular: No carotid bruit  Cardiovascular:      Rate and Rhythm: Normal rate and regular rhythm        Heart sounds: S1 normal and S2 normal  No murmur heard  No systolic murmur is present  Pulmonary:      Breath sounds: Decreased breath sounds present  No wheezing or rales  Abdominal:      General: Bowel sounds are normal  There is no distension  Musculoskeletal:      Right lower le+ Pitting Edema present  Left lower le+ Pitting Edema present  Skin:     General: Skin is warm  Findings: No lesion  Neurological:      General: No focal deficit present  Mental Status: She is alert  Psychiatric:         Mood and Affect: Mood normal          Pertinent Laboratory/Diagnostic Studies:    Laboratory studies reviewed personally by Cyndy Gold MD    BMP:   Lab Results   Component Value Date    SODIUM 139 2021    K 4 3 2021     2021    CO2 29 2021    BUN 9 2021    CREATININE 0 78 2021    CALCIUM 9 8 2021     CBC:  Lab Results   Component Value Date    WBC 10 25 (H) 2021    RBC 4 59 2021    HGB 12 6 2021    HCT 41 4 2021    MCV 90 2021    MCH 27 5 2021    RDW 12 9 2021     2021     Coags:    Lipid Profile:   No results found for: CHOL  Lab Results   Component Value Date    HDL 71 2021     Lab Results   Component Value Date    LDLCALC 138 (H) 2021     Lab Results   Component Value Date    TRIG 90 2021      Lab Results   Component Value Date    [id number] 1 820 2021     Lab Results   Component Value Date    ALT 24 2021    AST 11 2021         Imaging Studies:   No results found  Cardiac testing:   EKG reviewed personally: normal EKG, normal sinus rhythm  No results found for this or any previous visit  Orders Placed This Encounter   Procedures    CTA cardiac    Basic metabolic panel    NT-BNP PRO    POCT ECG           Gordon Lei MD, [facility]    Portions of the record may have been created with voice recognition software    Occasional wrong word or "sound a like" substitutions may have occurred due to the inherent limitations of voice recognition software  Read the chart carefully and recognize, using context, where substitutions have occurred  If you have any questions or concerns about the context, text or information contained within the body of this dictation, please contact myself, the provider, for further clarification  Allen Baron MD  9/3/2021 10:21 AM  Sign when Signing Visit   [address] [address]  Phone#  [phone]  Fax#  [phone]  [facility] Cardiology Office Consultation             NAME: Kirti Mcdaniel  AGE: 72 y o  SEX: female   : 1955   MRN: [id number]    DATE: 9/3/2021  TIME: 9:57 AM    Assessment and plan:    1  Dyspnea on exertion  -     Ambulatory referral to Cardiology  -     POCT ECG           Discussion:    Chief Complaint   Patient presents with   174 [facility] Patient Visit     establishing cardiac care referred by pcp    Palpitations     exertion, patient feels more of a burning feeling     Shortness of Breath     exertion     Dizziness     changing positions quickly     Ankle Swelling     both     Leg Swelling     both        HPI:    Nelia Alberto is a 72y o -year-old female who presents to the cardiology clinic for evaluation  She has been referred here for evaluation of shortness of breath which is rather chronic in nature  She has a  history of cigarette smoking, COPD, diabetes, dyslipidemia, referred for evaluation of exertional dyspnea  She recently (in ) underwent a stress ECHO which showed no ischemic changes but markedly reduced functional capacity at 3 minutes, limited by severe dyspnea and wheezing  Stress echo was technically difficult  No definite segmental wall motion abnormalities were noted , no ischemic changes on EKG  Recent CT of the chest personally reviewed showed mild aortic calcification    Coronaries were poorly visualized  Unable to appreciate any definite coronary calcification  Current symptoms:   She reports exertional palpitations and some burning in her chest   She reports shortness of breath on exertion  If she gets up too fast from the bed, she gets dizzy but has not had syncope  She reports mild ankle swelling that progresses as the day goes by  No prior history of coronary artery disease or heart failure  She quit smoking in 2012  She was a heavy smoker before that with a 60 pack-year smoking history  Past history, family history, social history, current medications, vital signs, recent lab and imaging studies and  prior cardiology studies reviewed independently on this visit  Cardiology Problem list:  Dyspnea:  6/21:  Stress test -symptomatic at 3 minutes, EF normal, limited by significant dyspnea and wheezing    Stress echo was technically difficult but no definite wall motion abnormality seen at peak stress  Former  60 pack year smoker:  Quit 10/2012  Dyslipidemia    BP Readings from Last 4 Encounters:   06/02/21 130/70   03/26/21 130/80   01/04/21 130/80   10/26/20 136/80      Wt Readings from Last 3 Encounters:   09/03/21 108 kg (237 lb 1 6 oz)   06/02/21 102 kg (225 lb)   03/26/21 101 kg (222 lb 3 2 oz)       Lab Results   Component Value Date    LDLCALC 138 (H) 01/04/2021     Lab Results   Component Value Date    CREATININE 0 78 01/04/2021      Lab Results   Component Value Date    [id number] 1 820 01/04/2021         ALLERGIES:  No Known Allergies      Current Outpatient Medications:     Blood Glucose Monitoring Suppl (OneTouch Verio Flex System) w/Device KIT, Use as directed, Disp: , Rfl:     glimepiride (AMARYL) 1 mg tablet, Take 1 tablet (1 mg total) by mouth daily with breakfast, Disp: 90 tablet, Rfl: 1    Januvia 100 MG tablet, TAKE 1 TABLET BY MOUTH DAILY, Disp: 90 tablet, Rfl: 1    OneTouch Delica Lancets 59S MISC, Use daily, Disp: 100 each, Rfl: 1    OneTouch Verio test strip, Use 1 each daily, Disp: 100 each, Rfl: 1    atorvastatin (LIPITOR) 10 mg tablet, Take 1 tablet (10 mg total) by mouth daily (Patient not taking: Reported on 6/2/2021), Disp: 30 tablet, Rfl: 5    Besivance 0 6 % SUSP, , Disp: , Rfl:     Prolensa 0 07 % SOLN, , Disp: , Rfl:       Review of Systems   Constitutional: Negative for activity change, appetite change and unexpected weight change  HENT: Negative for trouble swallowing  Eyes: Negative for visual disturbance  Respiratory: Positive for shortness of breath  Negative for chest tightness and wheezing  Cardiovascular: Positive for palpitations and leg swelling  Negative for chest pain  Gastrointestinal: Negative for blood in stool  Endocrine: Negative for polyuria  Elevated glucose and dyslipidemia   Genitourinary: Negative for hematuria  Musculoskeletal: Positive for arthralgias  Skin: Negative for rash  Neurological: Positive for dizziness  Negative for weakness  Hematological: Does not bruise/bleed easily  Psychiatric/Behavioral: Negative for behavioral problems         Past Medical History:   Diagnosis Date    Varicella        Past Surgical History:   Procedure Laterality Date    NO PAST SURGERIES         Family History   Problem Relation Age of Onset    Diabetes Mother     Hyperlipidemia Mother     Hypertension Mother     Atrial fibrillation Mother     Breast cancer Sister     Coronary artery disease Brother         3 stents    Hypertension Brother     Hyperlipidemia Brother     Thyroid cancer Daughter     Lung cancer Maternal Grandfather     Cervical cancer Cousin     Diabetes Father        Social History     Socioeconomic History    Marital status:      Spouse name: Not on file    Number of children: Not on file    Years of education: Not on file    Highest education level: Not on file   Occupational History    Not on file   Tobacco Use    Smoking status: Former Smoker     Packs/day: 1 50 Years: 40 00     Pack years: 60 00     Types: Cigarettes     Quit date: 10/26/2012     Years since quittin 8    Smokeless tobacco: Never Used   Vaping Use    Vaping Use: Never used   Substance and Sexual Activity    Alcohol use: Yes     Comment: occasionally    Drug use: Never    Sexual activity: Not on file   Other Topics Concern    Not on file   Social History Narrative        1 daughter    Lives with her daughter and mother    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     Social Determinants of Health     Financial Resource Strain: Low Risk     Difficulty of Paying Living Expenses: Not hard at all   Food Insecurity: No Food Insecurity    Worried About Running Out of Food in the Last Year: Never true    Josephine of Food in the Last Year: Never true   Transportation Needs: No Transportation Needs    Lack of Transportation (Medical): No    Lack of Transportation (Non-Medical): No   Physical Activity: Inactive    Days of Exercise per Week: 0 days    Minutes of Exercise per Session: 0 min   Stress: Stress Concern Present    Feeling of Stress : Rather much   Social Connections:     Frequency of Communication with Friends and Family:     Frequency of Social Gatherings with Friends and Family:     Attends Confucianism Services:     Active Member of Clubs or Organizations:     Attends Club or Organization Meetings:     Marital Status:    Intimate Partner Violence:     Fear of Current or Ex-Partner:     Emotionally Abused:     Physically Abused:     Sexually Abused:          Objective: There were no vitals filed for this visit    Wt Readings from Last 3 Encounters:   21 108 kg (237 lb 1 6 oz)   21 102 kg (225 lb)   21 101 kg (222 lb 3 2 oz)     Pulse Readings from Last 3 Encounters:   21 73   21 72   21 81     BP Readings from Last 3 Encounters:   21 130/70   21 130/80   21 130/80         Physical Exam  Constitutional:       General: She is not in acute distress  Appearance: She is obese  HENT:      Head: Normocephalic  Mouth/Throat:      Mouth: Mucous membranes are moist    Eyes:      Conjunctiva/sclera: Conjunctivae normal    Neck:      Vascular: No carotid bruit  Cardiovascular:      Rate and Rhythm: Normal rate and regular rhythm  Heart sounds: S1 normal and S2 normal  No murmur heard  No systolic murmur is present  Pulmonary:      Breath sounds: Decreased breath sounds present  No wheezing or rales  Abdominal:      General: Bowel sounds are normal  There is no distension  Musculoskeletal:      Right lower le+ Pitting Edema present  Left lower le+ Pitting Edema present  Skin:     General: Skin is warm  Findings: No lesion  Neurological:      General: No focal deficit present  Mental Status: She is alert  Psychiatric:         Mood and Affect: Mood normal          Pertinent Laboratory/Diagnostic Studies:    Laboratory studies reviewed personally by Christiana Soto MD    BMP:   Lab Results   Component Value Date    SODIUM 139 2021    K 4 3 2021     2021    CO2 29 2021    BUN 9 2021    CREATININE 0 78 2021    CALCIUM 9 8 2021     CBC:  Lab Results   Component Value Date    WBC 10 25 (H) 2021    RBC 4 59 2021    HGB 12 6 2021    HCT 41 4 2021    MCV 90 2021    MCH 27 5 2021    RDW 12 9 2021     2021     Coags:    Lipid Profile:   No results found for: CHOL  Lab Results   Component Value Date    HDL 71 2021     Lab Results   Component Value Date    LDLCALC 138 (H) 2021     Lab Results   Component Value Date    TRIG 90 2021      Lab Results   Component Value Date    [id number] 1 820 2021     Lab Results   Component Value Date    ALT 24 2021    AST 11 2021         Imaging Studies:   No results found      Cardiac testing:   EKG reviewed personally: normal EKG, normal sinus rhythm  No results found for this or any previous visit  Orders Placed This Encounter   Procedures    POCT ECG           Tiffany Rosas MD, [facility]    Portions of the record may have been created with voice recognition software  Occasional wrong word or "sound a like" substitutions may have occurred due to the inherent limitations of voice recognition software  Read the chart carefully and recognize, using context, where substitutions have occurred  If you have any questions or concerns about the context, text or information contained within the body of this dictation, please contact myself, the provider, for further clarification

## 2021-09-08 ENCOUNTER — PATIENT MESSAGE (OUTPATIENT)
Dept: FAMILY MEDICINE CLINIC | Facility: CLINIC | Age: 66
End: 2021-09-08

## 2021-09-14 ENCOUNTER — PREPPED CHART (OUTPATIENT)
Dept: URBAN - METROPOLITAN AREA CLINIC 6 | Facility: CLINIC | Age: 66
End: 2021-09-14

## 2021-10-12 ENCOUNTER — TELEPHONE (OUTPATIENT)
Dept: FAMILY MEDICINE CLINIC | Facility: CLINIC | Age: 66
End: 2021-10-12

## 2022-02-01 ENCOUNTER — TELEPHONE (OUTPATIENT)
Dept: FAMILY MEDICINE CLINIC | Facility: CLINIC | Age: 67
End: 2022-02-01

## 2022-02-01 DIAGNOSIS — E11.65 UNCONTROLLED TYPE 2 DIABETES MELLITUS WITH HYPERGLYCEMIA (HCC): ICD-10-CM

## 2022-02-01 RX ORDER — LANCETS 33 GAUGE
EACH MISCELLANEOUS
Qty: 100 EACH | Refills: 1 | Status: SHIPPED | OUTPATIENT
Start: 2022-02-01

## 2022-02-01 RX ORDER — BLOOD SUGAR DIAGNOSTIC
STRIP MISCELLANEOUS
Qty: 100 STRIP | Refills: 1 | Status: SHIPPED | OUTPATIENT
Start: 2022-02-01

## 2022-02-04 NOTE — TELEPHONE ENCOUNTER
----- Message from [id number] patience sent at 6/29/2021 10:11 PM EDT -----  Please contact patient with results of stress test   Stress test was negative for ischemia which is decreased oxygenation to the heart  However, the test was limited due to only short duration of exercise tolerance  She needs to follow up with cardiology as we previously discussed  Please give her contact information for [facility]  Also I would like her to complete a pulmonary function study to check her lung function  Please giver her central scheduling contact information for this 
L/M for pt to return this call for NP instructions  Phone number for [facility]  
Spoke with pt, results given per MD instructions 
no

## 2022-02-08 NOTE — TELEPHONE ENCOUNTER
2nd attempt - lm for pt to c/b the office to schedule appt or [facility]  (4) no impairment

## 2023-08-28 ENCOUNTER — OFFICE VISIT (OUTPATIENT)
Dept: FAMILY MEDICINE CLINIC | Facility: CLINIC | Age: 68
End: 2023-08-28
Payer: MEDICARE

## 2023-08-28 VITALS
HEART RATE: 71 BPM | DIASTOLIC BLOOD PRESSURE: 62 MMHG | RESPIRATION RATE: 16 BRPM | WEIGHT: 210.3 LBS | BODY MASS INDEX: 35.9 KG/M2 | HEIGHT: 64 IN | SYSTOLIC BLOOD PRESSURE: 114 MMHG | OXYGEN SATURATION: 98 % | TEMPERATURE: 98.2 F

## 2023-08-28 DIAGNOSIS — N76.0 ACUTE VAGINITIS: ICD-10-CM

## 2023-08-28 DIAGNOSIS — R39.9 URINARY SYMPTOM OR SIGN: ICD-10-CM

## 2023-08-28 DIAGNOSIS — E11.65 UNCONTROLLED TYPE 2 DIABETES MELLITUS WITH HYPERGLYCEMIA (HCC): ICD-10-CM

## 2023-08-28 DIAGNOSIS — E78.2 MIXED HYPERLIPIDEMIA: ICD-10-CM

## 2023-08-28 DIAGNOSIS — E66.01 CLASS 2 SEVERE OBESITY DUE TO EXCESS CALORIES WITH SERIOUS COMORBIDITY AND BODY MASS INDEX (BMI) OF 38.0 TO 38.9 IN ADULT (HCC): ICD-10-CM

## 2023-08-28 DIAGNOSIS — E11.9 TYPE 2 DIABETES MELLITUS WITHOUT COMPLICATION, WITHOUT LONG-TERM CURRENT USE OF INSULIN (HCC): Primary | ICD-10-CM

## 2023-08-28 DIAGNOSIS — I87.2 VENOUS STASIS DERMATITIS OF BOTH LOWER EXTREMITIES: ICD-10-CM

## 2023-08-28 LAB
SL AMB  POCT GLUCOSE, UA: NEGATIVE
SL AMB LEUKOCYTE ESTERASE,UA: NEGATIVE
SL AMB POCT BILIRUBIN,UA: NEGATIVE
SL AMB POCT BLOOD,UA: NEGATIVE
SL AMB POCT CLARITY,UA: CLEAR
SL AMB POCT COLOR,UA: YELLOW
SL AMB POCT KETONES,UA: NEGATIVE
SL AMB POCT NITRITE,UA: NEGATIVE
SL AMB POCT PH,UA: 6
SL AMB POCT SPECIFIC GRAVITY,UA: 1.01
SL AMB POCT URINE PROTEIN: NEGATIVE
SL AMB POCT UROBILINOGEN: 0.2

## 2023-08-28 PROCEDURE — 81002 URINALYSIS NONAUTO W/O SCOPE: CPT | Performed by: PHYSICIAN ASSISTANT

## 2023-08-28 PROCEDURE — 87086 URINE CULTURE/COLONY COUNT: CPT | Performed by: PHYSICIAN ASSISTANT

## 2023-08-28 PROCEDURE — 87147 CULTURE TYPE IMMUNOLOGIC: CPT | Performed by: PHYSICIAN ASSISTANT

## 2023-08-28 PROCEDURE — 99214 OFFICE O/P EST MOD 30 MIN: CPT | Performed by: PHYSICIAN ASSISTANT

## 2023-08-28 RX ORDER — GLIMEPIRIDE 1 MG/1
1 TABLET ORAL
Qty: 90 TABLET | Refills: 0 | Status: SHIPPED | OUTPATIENT
Start: 2023-08-28

## 2023-08-28 RX ORDER — FLUCONAZOLE 150 MG/1
150 TABLET ORAL ONCE
Qty: 1 TABLET | Refills: 0 | Status: SHIPPED | OUTPATIENT
Start: 2023-08-28 | End: 2023-08-28

## 2023-08-28 NOTE — PROGRESS NOTES
Assessment/Plan:    1. Type 2 DM - uncontrolled, off all medications while caring for her mother, committed to getting back on track now. Will start with labs, most likely initiate metformin 1000 mg bid pending results. Will get opthal done. Micro today. Resume checking sugars. 2. Dysuria - will send urine for culture, possibly yeast infection from uncontrolled DM, consider cultures    3. Hyperlipidemia - was on lipitor in past, will start with lipids    4. Morbid obesity - should work on diet, walking    5. Venous stasis dermatitis - discussed pathophysiology, can try compression stockings. Need to work on weight loss    F/u 1 month or sooner if needed    Subjective:   Chief Complaint   Patient presents with   • Follow-up     Diabetes/Redness on legs      Patient ID: Keiry Hernandez is a 79 y.o. female. Patient here to establish care. For the past couple weeks has had burning with urination. Tried monistat with no relief. Burning with urination, nocturia, frequency denies hematuria. Temp off and on highest 101 F. Patient carrying for elderly mom, recently . Stopped her routine medical health while carrying for mom. Ready to get back on track. History of DM and hyperlipidemia.        The following portions of the patient's history were reviewed and updated as appropriate: allergies, current medications, past family history, past medical history, past social history, past surgical history and problem list.    Past Medical History:   Diagnosis Date   • Varicella      Past Surgical History:   Procedure Laterality Date   • NO PAST SURGERIES       Family History   Problem Relation Age of Onset   • Diabetes Mother    • Hyperlipidemia Mother    • Hypertension Mother    • Atrial fibrillation Mother    • Breast cancer Sister    • Coronary artery disease Brother         3 stents   • Hypertension Brother    • Hyperlipidemia Brother    • Thyroid cancer Daughter    • Lung cancer Maternal Grandfather    • Cervical cancer Cousin    • Diabetes Father      Social History     Socioeconomic History   • Marital status:      Spouse name: Not on file   • Number of children: Not on file   • Years of education: Not on file   • Highest education level: Not on file   Occupational History   • Not on file   Tobacco Use   • Smoking status: Former     Packs/day: 1.50     Years: 40.00     Total pack years: 60.00     Types: Cigarettes     Quit date: 10/26/2012     Years since quitting: 10.8   • Smokeless tobacco: Never   Vaping Use   • Vaping Use: Never used   Substance and Sexual Activity   • Alcohol use: Yes     Comment: occasionally   • Drug use: Never   • Sexual activity: Not on file   Other Topics Concern   • Not on file   Social History Narrative        1 daughter    Lives with her daughter and mother    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     Social Determinants of Health     Financial Resource Strain: Low Risk  (7/12/2021)    Overall Financial Resource Strain (CARDIA)    • Difficulty of Paying Living Expenses: Not hard at all   Food Insecurity: No Food Insecurity (7/12/2021)    Hunger Vital Sign    • Worried About Running Out of Food in the Last Year: Never true    • Ran Out of Food in the Last Year: Never true   Transportation Needs: No Transportation Needs (7/12/2021)    PRAPARE - Transportation    • Lack of Transportation (Medical): No    • Lack of Transportation (Non-Medical):  No   Physical Activity: Inactive (7/12/2021)    Exercise Vital Sign    • Days of Exercise per Week: 0 days    • Minutes of Exercise per Session: 0 min   Stress: Stress Concern Present (7/12/2021)    [facility]    • Feeling of Stress : Rather much   Social Connections: Not on file   Intimate Partner Violence: Not on file   Housing Stability: [address]  (7/12/2021)    Housing Stability Vital Sign    • Unable to Pay for Housing in the Last Year: No    • Number of Places Lived in the Last Year: 1    • Unstable Housing in the Last Year: No       Current Outpatient Medications:   •  Blood Glucose Monitoring Suppl (OneTouch Verio Flex System) w/Device KIT, Use as directed, Disp: , Rfl:   •  glimepiride (AMARYL) 1 mg tablet, TAKE ONE TABLET BY MOUTH EVERY DAY WITH BREAKFAST, Disp: 90 tablet, Rfl: 0  •  Januvia 100 MG tablet, TAKE ONE TABLET BY MOUTH EVERY DAY, Disp: 90 tablet, Rfl: 0  •  Lancets (OneTouch Delica Plus [id number]) MISC, USE TO TEST ONCE DAILY, Disp: 100 each, Rfl: 1  •  OneTouch Verio test strip, TEST ONCE DAILY, Disp: 100 strip, Rfl: 1    Review of Systems          Objective:    Vitals:    08/28/23 1241   BP: 114/62   Pulse: 71   Resp: 16   Temp: 98.2 °F (36.8 °C)   TempSrc: Oral   SpO2: 98%   Weight: 95.4 kg (210 lb 4.8 oz)   Height: 5' 4" (1.626 m)        Physical Exam  Constitutional:       Appearance: Normal appearance. She is well-developed and normal weight. HENT:      Head: Normocephalic and atraumatic. Cardiovascular:      Rate and Rhythm: Normal rate and regular rhythm. Pulses: Normal pulses. no weak pulses          Dorsalis pedis pulses are 2+ on the right side and 2+ on the left side. Posterior tibial pulses are 2+ on the right side and 2+ on the left side. Heart sounds: Normal heart sounds. Pulmonary:      Effort: Pulmonary effort is normal.      Breath sounds: Normal breath sounds. Musculoskeletal:         General: Normal range of motion. Cervical back: Normal range of motion. Right lower leg: No edema. Left lower leg: No edema. Feet:      Right foot:      Skin integrity: No ulcer, skin breakdown, erythema, warmth, callus or dry skin. Left foot:      Skin integrity: No ulcer, skin breakdown, erythema, warmth, callus or dry skin. Skin:     General: Skin is warm. Comments: B/l lower legs with a dusky red color change, no warmth, not tender, no discharge   Neurological:      General: No focal deficit present.       Mental Status: She is alert and oriented to person, place, and time. Psychiatric:         Mood and Affect: Mood normal.         Behavior: Behavior normal.         Thought Content: Thought content normal.         Judgment: Judgment normal.       Patient's shoes and socks removed. Right Foot/Ankle   Right Foot Inspection  Skin Exam: skin normal and skin intact. No dry skin, no warmth, no callus, no erythema, no maceration, no abnormal color, no pre-ulcer, no ulcer and no callus. Toe Exam: ROM and strength within normal limits. Sensory   Vibration: intact  Proprioception: intact  Monofilament testing: intact    Vascular  Capillary refills: < 3 seconds  The right DP pulse is 2+. The right PT pulse is 2+. Left Foot/Ankle  Left Foot Inspection  Skin Exam: skin normal and skin intact. No dry skin, no warmth, no erythema, no maceration, normal color, no pre-ulcer, no ulcer and no callus. Toe Exam: ROM and strength within normal limits. Sensory   Vibration: intact  Proprioception: intact  Monofilament testing: intact    Vascular  Capillary refills: < 3 seconds  The left DP pulse is 2+. The left PT pulse is 2+. Assign Risk Category  No deformity present  No loss of protective sensation  No weak pulses  Risk: 0      BMI Counseling: Body mass index is 36.1 kg/m². The BMI is above normal. Nutrition recommendations include reducing portion sizes.

## 2023-08-30 ENCOUNTER — APPOINTMENT (OUTPATIENT)
Dept: LAB | Facility: CLINIC | Age: 68
End: 2023-08-30
Payer: MEDICARE

## 2023-08-30 DIAGNOSIS — E11.9 TYPE 2 DIABETES MELLITUS WITHOUT COMPLICATION, WITHOUT LONG-TERM CURRENT USE OF INSULIN (HCC): ICD-10-CM

## 2023-08-30 LAB
ALBUMIN SERPL BCP-MCNC: 3.7 G/DL (ref 3.5–5)
ALP SERPL-CCNC: 134 U/L (ref 34–104)
ALT SERPL W P-5'-P-CCNC: 19 U/L (ref 7–52)
ANION GAP SERPL CALCULATED.3IONS-SCNC: 8 MMOL/L
AST SERPL W P-5'-P-CCNC: 23 U/L (ref 13–39)
BILIRUB SERPL-MCNC: 0.38 MG/DL (ref 0.2–1)
BUN SERPL-MCNC: 13 MG/DL (ref 5–25)
CALCIUM SERPL-MCNC: 8.5 MG/DL (ref 8.4–10.2)
CHLORIDE SERPL-SCNC: 104 MMOL/L (ref 96–108)
CHOLEST SERPL-MCNC: 210 MG/DL
CO2 SERPL-SCNC: 27 MMOL/L (ref 21–32)
CREAT SERPL-MCNC: 0.69 MG/DL (ref 0.6–1.3)
CREAT UR-MCNC: 83.1 MG/DL
ERYTHROCYTE [DISTWIDTH] IN BLOOD BY AUTOMATED COUNT: 13.4 % (ref 11.6–15.1)
EST. AVERAGE GLUCOSE BLD GHB EST-MCNC: 235 MG/DL
GFR SERPL CREATININE-BSD FRML MDRD: 90 ML/MIN/1.73SQ M
GLUCOSE P FAST SERPL-MCNC: 194 MG/DL (ref 65–99)
HBA1C MFR BLD: 9.8 %
HCT VFR BLD AUTO: 39.3 % (ref 34.8–46.1)
HDLC SERPL-MCNC: 65 MG/DL
HGB BLD-MCNC: 12.1 G/DL (ref 11.5–15.4)
LDLC SERPL CALC-MCNC: 128 MG/DL (ref 0–100)
MCH RBC QN AUTO: 28.3 PG (ref 26.8–34.3)
MCHC RBC AUTO-ENTMCNC: 30.8 G/DL (ref 31.4–37.4)
MCV RBC AUTO: 92 FL (ref 82–98)
MICROALBUMIN UR-MCNC: <7 MG/L
MICROALBUMIN/CREAT 24H UR: <8 MG/G CREATININE (ref 0–30)
PLATELET # BLD AUTO: 351 THOUSANDS/UL (ref 149–390)
PMV BLD AUTO: 11.2 FL (ref 8.9–12.7)
POTASSIUM SERPL-SCNC: 5.2 MMOL/L (ref 3.5–5.3)
PROT SERPL-MCNC: 6.8 G/DL (ref 6.4–8.4)
RBC # BLD AUTO: 4.27 MILLION/UL (ref 3.81–5.12)
SODIUM SERPL-SCNC: 139 MMOL/L (ref 135–147)
TRIGL SERPL-MCNC: 85 MG/DL
TSH SERPL DL<=0.05 MIU/L-ACNC: 3.67 UIU/ML (ref 0.45–4.5)
WBC # BLD AUTO: 9.19 THOUSAND/UL (ref 4.31–10.16)

## 2023-08-30 PROCEDURE — 80053 COMPREHEN METABOLIC PANEL: CPT

## 2023-08-30 PROCEDURE — 80061 LIPID PANEL: CPT

## 2023-08-30 PROCEDURE — 82570 ASSAY OF URINE CREATININE: CPT

## 2023-08-30 PROCEDURE — 36415 COLL VENOUS BLD VENIPUNCTURE: CPT

## 2023-08-30 PROCEDURE — 84443 ASSAY THYROID STIM HORMONE: CPT

## 2023-08-30 PROCEDURE — 83036 HEMOGLOBIN GLYCOSYLATED A1C: CPT

## 2023-08-30 PROCEDURE — 85027 COMPLETE CBC AUTOMATED: CPT

## 2023-08-30 PROCEDURE — 82043 UR ALBUMIN QUANTITATIVE: CPT

## 2023-08-31 LAB — BACTERIA UR CULT: ABNORMAL

## 2023-09-21 ENCOUNTER — RA CDI HCC (OUTPATIENT)
Dept: OTHER | Facility: HOSPITAL | Age: 68
End: 2023-09-21

## 2023-09-28 ENCOUNTER — OFFICE VISIT (OUTPATIENT)
Dept: FAMILY MEDICINE CLINIC | Facility: CLINIC | Age: 68
End: 2023-09-28
Payer: MEDICARE

## 2023-09-28 VITALS
SYSTOLIC BLOOD PRESSURE: 122 MMHG | DIASTOLIC BLOOD PRESSURE: 60 MMHG | WEIGHT: 217 LBS | RESPIRATION RATE: 16 BRPM | HEART RATE: 74 BPM | HEIGHT: 64 IN | BODY MASS INDEX: 37.05 KG/M2 | OXYGEN SATURATION: 95 % | TEMPERATURE: 97.1 F

## 2023-09-28 DIAGNOSIS — E66.01 CLASS 2 SEVERE OBESITY DUE TO EXCESS CALORIES WITH SERIOUS COMORBIDITY AND BODY MASS INDEX (BMI) OF 38.0 TO 38.9 IN ADULT: ICD-10-CM

## 2023-09-28 DIAGNOSIS — E78.2 MIXED HYPERLIPIDEMIA: ICD-10-CM

## 2023-09-28 DIAGNOSIS — Z12.31 ENCOUNTER FOR SCREENING MAMMOGRAM FOR MALIGNANT NEOPLASM OF BREAST: ICD-10-CM

## 2023-09-28 DIAGNOSIS — E11.9 TYPE 2 DIABETES MELLITUS WITHOUT COMPLICATION, WITHOUT LONG-TERM CURRENT USE OF INSULIN (HCC): ICD-10-CM

## 2023-09-28 DIAGNOSIS — Z78.0 POST-MENOPAUSAL: ICD-10-CM

## 2023-09-28 DIAGNOSIS — Z00.00 MEDICARE ANNUAL WELLNESS VISIT, SUBSEQUENT: Primary | ICD-10-CM

## 2023-09-28 DIAGNOSIS — E11.65 UNCONTROLLED TYPE 2 DIABETES MELLITUS WITH HYPERGLYCEMIA (HCC): ICD-10-CM

## 2023-09-28 DIAGNOSIS — Z23 NEED FOR VACCINATION: ICD-10-CM

## 2023-09-28 PROCEDURE — G0438 PPPS, INITIAL VISIT: HCPCS | Performed by: PHYSICIAN ASSISTANT

## 2023-09-28 PROCEDURE — 99214 OFFICE O/P EST MOD 30 MIN: CPT | Performed by: PHYSICIAN ASSISTANT

## 2023-09-28 PROCEDURE — G0008 ADMIN INFLUENZA VIRUS VAC: HCPCS

## 2023-09-28 PROCEDURE — 90662 IIV NO PRSV INCREASED AG IM: CPT

## 2023-09-28 RX ORDER — BLOOD SUGAR DIAGNOSTIC
STRIP MISCELLANEOUS
Qty: 100 STRIP | Refills: 1 | Status: SHIPPED | OUTPATIENT
Start: 2023-09-28

## 2023-09-28 NOTE — PATIENT INSTRUCTIONS
Medicare Preventive Visit Patient Instructions  Thank you for completing your Welcome to Medicare Visit or Medicare Annual Wellness Visit today. Your next wellness visit will be due in one year (9/28/2024). The screening/preventive services that you may require over the next 5-10 years are detailed below. Some tests may not apply to you based off risk factors and/or age. Screening tests ordered at today's visit but not completed yet may show as past due. Also, please note that scanned in results may not display below. Preventive Screenings:  Service Recommendations Previous Testing/Comments   Colorectal Cancer Screening  * Colonoscopy    * Fecal Occult Blood Test (FOBT)/Fecal Immunochemical Test (FIT)  * Fecal DNA/Cologuard Test  * Flexible Sigmoidoscopy Age: 43-73 years old   Colonoscopy: every 10 years (may be performed more frequently if at higher risk)  OR  FOBT/FIT: every 1 year  OR  Cologuard: every 3 years  OR  Sigmoidoscopy: every 5 years  Screening may be recommended earlier than age 39 if at higher risk for colorectal cancer. Also, an individualized decision between you and your healthcare provider will decide whether screening between the ages of 77-80 would be appropriate. Colonoscopy: 10/12/2018  FOBT/FIT: Not on file  Cologuard: Not on file  Sigmoidoscopy: Not on file    Screening Current     Breast Cancer Screening Age: 36 years old  Frequency: every 1-2 years  Not required if history of left and right mastectomy Mammogram: 08/04/2020        Cervical Cancer Screening Between the ages of 21-29, pap smear recommended once every 3 years. Between the ages of 32-69, can perform pap smear with HPV co-testing every 5 years.    Recommendations may differ for women with a history of total hysterectomy, cervical cancer, or abnormal pap smears in past. Pap Smear: 09/07/2018    Screening Not Indicated   Hepatitis C Screening Once for adults born between 1945 and 1965  More frequently in patients at high risk for Hepatitis C Hep C Antibody: 08/14/2018    Screening Current   Diabetes Screening 1-2 times per year if you're at risk for diabetes or have pre-diabetes Fasting glucose: 194 mg/dL (8/30/2023)  A1C: 9.8 % (8/30/2023)  Screening Not Indicated  History Diabetes   Cholesterol Screening Once every 5 years if you don't have a lipid disorder. May order more often based on risk factors. Lipid panel: 08/30/2023    Screening Not Indicated  History Lipid Disorder     Other Preventive Screenings Covered by Medicare:  1. Abdominal Aortic Aneurysm (AAA) Screening: covered once if your at risk. You're considered to be at risk if you have a family history of AAA. 2. Lung Cancer Screening: covers low dose CT scan once per year if you meet all of the following conditions: (1) Age 48-67; (2) No signs or symptoms of lung cancer; (3) Current smoker or have quit smoking within the last 15 years; (4) You have a tobacco smoking history of at least 20 pack years (packs per day multiplied by number of years you smoked); (5) You get a written order from a healthcare provider. 3. Glaucoma Screening: covered annually if you're considered high risk: (1) You have diabetes OR (2) Family history of glaucoma OR (3)  aged 48 and older OR (3)  American aged 72 and older  3. Osteoporosis Screening: covered every 2 years if you meet one of the following conditions: (1) You're estrogen deficient and at risk for osteoporosis based off medical history and other findings; (2) Have a vertebral abnormality; (3) On glucocorticoid therapy for more than 3 months; (4) Have primary hyperparathyroidism; (5) On osteoporosis medications and need to assess response to drug therapy. · Last bone density test (DXA Scan): Not on file. 5. HIV Screening: covered annually if you're between the age of 14-79. Also covered annually if you are younger than 13 and older than 72 with risk factors for HIV infection.  For pregnant patients, it is covered up to 3 times per pregnancy. Immunizations:  Immunization Recommendations   Influenza Vaccine Annual influenza vaccination during flu season is recommended for all persons aged >= 6 months who do not have contraindications   Pneumococcal Vaccine   * Pneumococcal conjugate vaccine = PCV13 (Prevnar 13), PCV15 (Vaxneuvance), PCV20 (Prevnar 20)  * Pneumococcal polysaccharide vaccine = PPSV23 (Pneumovax) Adults 20-63 years old: 1-3 doses may be recommended based on certain risk factors  Adults 72 years old: 1-2 doses may be recommended based off what pneumonia vaccine you previously received   Hepatitis B Vaccine 3 dose series if at intermediate or high risk (ex: diabetes, end stage renal disease, liver disease)   Tetanus (Td) Vaccine - COST NOT COVERED BY MEDICARE PART B Following completion of primary series, a booster dose should be given every 10 years to maintain immunity against tetanus. Td may also be given as tetanus wound prophylaxis. Tdap Vaccine - COST NOT COVERED BY MEDICARE PART B Recommended at least once for all adults. For pregnant patients, recommended with each pregnancy. Shingles Vaccine (Shingrix) - COST NOT COVERED BY MEDICARE PART B  2 shot series recommended in those aged 48 and above     Health Maintenance Due:      Topic Date Due   • Lung Cancer Screening  06/07/2022   • Breast Cancer Screening: Mammogram  08/04/2022   • Cervical Cancer Screening  09/07/2023   • Colorectal Cancer Screening  10/12/2028   • Hepatitis C Screening  Completed     Immunizations Due:      Topic Date Due   • Pneumococcal Vaccine: 65+ Years (1 - PCV) Never done   • COVID-19 Vaccine (3 - Pfizer series) 06/18/2021   • Influenza Vaccine (1) 09/01/2023     Advance Directives   What are advance directives? Advance directives are legal documents that state your wishes and plans for medical care. These plans are made ahead of time in case you lose your ability to make decisions for yourself.  Advance directives can apply to any medical decision, such as the treatments you want, and if you want to donate organs. What are the types of advance directives? There are many types of advance directives, and each state has rules about how to use them. You may choose a combination of any of the following:  · Living will: This is a written record of the treatment you want. You can also choose which treatments you do not want, which to limit, and which to stop at a certain time. This includes surgery, medicine, IV fluid, and tube feedings. · Durable power of  for healthcare [facility]): This is a written record that states who you want to make healthcare choices for you when you are unable to make them for yourself. This person, called a proxy, is usually a family member or a friend. You may choose more than 1 proxy. · Do not resuscitate (DNR) order:  A DNR order is used in case your heart stops beating or you stop breathing. It is a request not to have certain forms of treatment, such as CPR. A DNR order may be included in other types of advance directives. · Medical directive: This covers the care that you want if you are in a coma, near death, or unable to make decisions for yourself. You can list the treatments you want for each condition. Treatment may include pain medicine, surgery, blood transfusions, dialysis, IV or tube feedings, and a ventilator (breathing machine). · Values history: This document has questions about your views, beliefs, and how you feel and think about life. This information can help others choose the care that you would choose. Why are advance directives important? An advance directive helps you control your care. Although spoken wishes may be used, it is better to have your wishes written down. Spoken wishes can be misunderstood, or not followed. Treatments may be given even if you do not want them.  An advance directive may make it easier for your family to make difficult choices about your care. Weight Management   Why it is important to manage your weight:  Being overweight increases your risk of health conditions such as heart disease, high blood pressure, type 2 diabetes, and certain types of cancer. It can also increase your risk for osteoarthritis, sleep apnea, and other respiratory problems. Aim for a slow, steady weight loss. Even a small amount of weight loss can lower your risk of health problems. How to lose weight safely:  A safe and healthy way to lose weight is to eat fewer calories and get regular exercise. You can lose up about 1 pound a week by decreasing the number of calories you eat by 500 calories each day. Healthy meal plan for weight management:  A healthy meal plan includes a variety of foods, contains fewer calories, and helps you stay healthy. A healthy meal plan includes the following:  · Eat whole-grain foods more often. A healthy meal plan should contain fiber. Fiber is the part of grains, fruits, and vegetables that is not broken down by your body. Whole-grain foods are healthy and provide extra fiber in your diet. Some examples of whole-grain foods are whole-wheat breads and pastas, oatmeal, brown rice, and bulgur. · Eat a variety of vegetables every day. Include dark, leafy greens such as spinach, kale, cassie greens, and mustard greens. Eat yellow and orange vegetables such as carrots, sweet potatoes, and winter squash. · Eat a variety of fruits every day. Choose fresh or canned fruit (canned in its own juice or light syrup) instead of juice. Fruit juice has very little or no fiber. · Eat low-fat dairy foods. Drink fat-free (skim) milk or 1% milk. Eat fat-free yogurt and low-fat cottage cheese. Try low-fat cheeses such as mozzarella and other reduced-fat cheeses. · Choose meat and other protein foods that are low in fat. Choose beans or other legumes such as split peas or lentils.  Choose fish, skinless poultry (chicken or turkey), or lean cuts of red meat (beef or pork). Before you cook meat or poultry, cut off any visible fat. · Use less fat and oil. Try baking foods instead of frying them. Add less fat, such as margarine, sour cream, regular salad dressing and mayonnaise to foods. Eat fewer high-fat foods. Some examples of high-fat foods include french fries, doughnuts, ice cream, and cakes. · Eat fewer sweets. Limit foods and drinks that are high in sugar. This includes candy, cookies, regular soda, and sweetened drinks. Exercise:  Exercise at least 30 minutes per day on most days of the week. Some examples of exercise include walking, biking, dancing, and swimming. You can also fit in more physical activity by taking the stairs instead of the elevator or parking farther away from stores. Ask your healthcare provider about the best exercise plan for you. © Copyright [employer] 2018 Information is for End User's use only and may not be sold, redistributed or otherwise used for commercial purposes.  All illustrations and images included in CareNotes® are the copyrighted property of [employer] or [address] [place] [address]

## 2023-09-28 NOTE — PROGRESS NOTES
Assessment and Plan:     1. AWV - conducted    2. Type 2 DM - on Januvia and amaryl again, PP BS are 170-180, has not checked fasting, will resume this. Follow up in 2 month with A1C, will schedule with eye doctor    3. Hyperlipidemia - was on lipitor in past, , will work on diet repeat this in 6 months, if not within range will restart lipitor     4. Morbid obesity - should work on diet, walking     5. Venous stasis dermatitis - discussed pathophysiology, can try compression stockings. Need to work on weight loss     F/u 2 month or sooner if needed      Preventive health issues were discussed with patient, and age appropriate screening tests were ordered as noted in patient's After Visit Summary. Personalized health advice and appropriate referrals for health education or preventive services given if needed, as noted in patient's After Visit Summary. History of Present Illness:     Patient presents for a Medicare Wellness Visit    Patient here for follow up. Working on diet changes, walking. Restarted Januvia and Amaryl. Post prandial sugars have been 170-180 with this. Has not checked fasting, ran out of glucose strips. Patient Care Team:  Shaq Philip PA-C as PCP - General (Family Medicine)  Timur Navarro, [address] [place] [address] (Optometry)  Shaq Philip PA-C (Family Medicine)     Review of Systems:     Review of Systems   Constitutional: Negative. HENT: Negative. Eyes: Negative. Respiratory: Negative. Cardiovascular: Negative. Gastrointestinal: Negative. Endocrine: Negative. Genitourinary: Negative. Musculoskeletal: Negative. Skin: Negative. Allergic/Immunologic: Negative. Neurological: Negative. Hematological: Negative. Psychiatric/Behavioral: Negative.          Problem List:     Patient Active Problem List   Diagnosis   • Type 2 diabetes mellitus without complication, without long-term current use of insulin (HCC)   • Dyspnea on exertion   • Dense breast   • Bilateral hearing loss   • Class 2 severe obesity due to excess calories with serious comorbidity and body mass index (BMI) of 38.0 to 38.9 in adult [facility])   • Cataracts, bilateral   • Hyperlipidemia      Past Medical and Surgical History:     Past Medical History:   Diagnosis Date   • Varicella      Past Surgical History:   Procedure Laterality Date   • NO PAST SURGERIES        Family History:     Family History   Problem Relation Age of Onset   • Diabetes Mother    • Hyperlipidemia Mother    • Hypertension Mother    • Atrial fibrillation Mother    • Breast cancer Sister    • Coronary artery disease Brother         3 stents   • Hypertension Brother    • Hyperlipidemia Brother    • Thyroid cancer Daughter    • Lung cancer Maternal Grandfather    • Cervical cancer Cousin    • Diabetes Father       Social History:     Social History     Socioeconomic History   • Marital status:      Spouse name: None   • Number of children: None   • Years of education: None   • Highest education level: None   Occupational History   • None   Tobacco Use   • Smoking status: Former     Packs/day: 1.50     Years: 40.00     Total pack years: 60.00     Types: Cigarettes     Quit date: 10/26/2012     Years since quitting: 10.9   • Smokeless tobacco: Never   Vaping Use   • Vaping Use: Never used   Substance and Sexual Activity   • Alcohol use: Yes     Comment: occasionally   • Drug use: Never   • Sexual activity: None   Other Topics Concern   • None   Social History Narrative        1 daughter    Lives with her daughter and mother    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     Social Determinants of Health     Financial Resource Strain: Low Risk  (9/28/2023)    Overall Financial Resource Strain (CARDIA)    • Difficulty of Paying Living Expenses: Not hard at all   Food Insecurity: No Food Insecurity (7/12/2021)    Hunger Vital Sign    • Worried About Running Out of Food in the Last Year: Never true    • Ran Out of Food in the Last Year: Never true   Transportation Needs: No Transportation Needs (9/28/2023)    PRAPARE - Transportation    • Lack of Transportation (Medical): No    • Lack of Transportation (Non-Medical): No   Physical Activity: Inactive (7/12/2021)    Exercise Vital Sign    • Days of Exercise per Week: 0 days    • Minutes of Exercise per Session: 0 min   Stress: Stress Concern Present (7/12/2021)    [address] [facility]    • Feeling of Stress : Rather much   Social Connections: Not on file   Intimate Partner Violence: Not on file   Housing Stability: Low Risk  (7/12/2021)    Housing Stability Vital Sign    • Unable to Pay for Housing in the Last Year: No    • Number of Places Lived in the Last Year: 1    • Unstable Housing in the Last Year: No      Medications and Allergies:     Current Outpatient Medications   Medication Sig Dispense Refill   • Blood Glucose Monitoring Suppl (OneTouch Verio Flex System) w/Device KIT Use as directed     • glimepiride (AMARYL) 1 mg tablet Take 1 tablet (1 mg total) by mouth daily with breakfast 90 tablet 0   • Lancets (OneTouch Delica Plus [id number]) MISC USE TO TEST ONCE DAILY 100 each 1   • OneTouch Verio test strip TEST ONCE DAILY 100 strip 1   • sitaGLIPtin (Januvia) 100 mg tablet Take 1 tablet (100 mg total) by mouth daily 90 tablet 0     No current facility-administered medications for this visit.      No Known Allergies   Immunizations:     Immunization History   Administered Date(s) Administered   • COVID-19 PFIZER VACCINE 0.3 ML IM 03/31/2021, 04/23/2021   • INFLUENZA 09/19/2015, 09/06/2018, 10/23/2020   • Influenza Quadrivalent Recombinant Preservative Free IM 10/23/2020   • Influenza, recombinant, quadrivalent,injectable, preservative free 10/26/2020   • Tdap 09/11/2007      Health Maintenance:         Topic Date Due   • Lung Cancer Screening  06/07/2022   • Breast Cancer Screening: Mammogram  08/04/2022   • Cervical Cancer Screening 09/07/2023   • Colorectal Cancer Screening  10/12/2028   • Hepatitis C Screening  Completed         Topic Date Due   • Pneumococcal Vaccine: 65+ Years (1 - PCV) Never done   • COVID-19 Vaccine (3 - Pfizer series) 06/18/2021   • Influenza Vaccine (1) 09/01/2023      Medicare Screening Tests and Risk Assessments:     Ria Stoner is here for her Subsequent Wellness visit. Health Risk Assessment:   Patient rates overall health as fair. Patient feels that their physical health rating is same. Patient is satisfied with their life. Eyesight was rated as slightly worse. Hearing was rated as slightly worse. Patient feels that their emotional and mental health rating is same. Patients states they are never, rarely angry. Patient states they are sometimes unusually tired/fatigued. Pain experienced in the last 7 days has been none. Patient states that she has experienced no weight loss or gain in last 6 months. Depression Screening:   PHQ-2 Score: 0      Fall Risk Screening: In the past year, patient has experienced: no history of falling in past year      Urinary Incontinence Screening:   Patient has not leaked urine accidently in the last six months. Home Safety:  Patient has trouble with stairs inside or outside of their home. Patient has working smoke alarms and has working carbon monoxide detector. Home safety hazards include: none. Nutrition:   Current diet is Regular. Medications:   Patient is currently taking over-the-counter supplements. OTC medications include: see medication list. Patient is able to manage medications. Activities of Daily Living (ADLs)/Instrumental Activities of Daily Living (IADLs):   Walk and transfer into and out of bed and chair?: Yes  Dress and groom yourself?: Yes    Bathe or shower yourself?: Yes    Feed yourself?  Yes  Do your laundry/housekeeping?: Yes  Manage your money, pay your bills and track your expenses?: Yes  Make your own meals?: Yes    Do your own shopping?: Yes    Previous Hospitalizations:   Any hospitalizations or ED visits within the last 12 months?: No      Advance Care Planning:   Living will: No    Advanced directive: No    Advanced directive counseling given: Yes    End of Life Decisions reviewed with patient: Yes    Provider agrees with end of life decisions: Yes      Cognitive Screening:   Provider or family/friend/caregiver concerned regarding cognition?: No    PREVENTIVE SCREENINGS      Cardiovascular Screening:    General: Screening Not Indicated and History Lipid Disorder      Diabetes Screening:     General: Screening Not Indicated and History Diabetes      Colorectal Cancer Screening:     General: Screening Current      Breast Cancer Screening:     General: Risks and Benefits Discussed    Due for: Mammogram        Cervical Cancer Screening:    General: Screening Not Indicated      Osteoporosis Screening:    General: Screening Not Indicated      Abdominal Aortic Aneurysm (AAA) Screening:        General: Screening Not Indicated      Lung Cancer Screening:     General: Patient Declines      Hepatitis C Screening:    General: Screening Current    Screening, Brief Intervention, and Referral to Treatment (SBIRT)    Screening  Typical number of drinks in a day: 0  Typical number of drinks in a week: 0  Interpretation: Low risk drinking behavior. Single Item Drug Screening:  How often have you used an illegal drug (including marijuana) or a prescription medication for non-medical reasons in the past year? never    Single Item Drug Screen Score: 0  Interpretation: Negative screen for possible drug use disorder    Brief Intervention  Alcohol & drug use screenings were reviewed. No concerns regarding substance use disorder identified. Other Counseling Topics:   Car/seat belt/driving safety, skin self-exam, sunscreen and calcium and vitamin D intake and regular weightbearing exercise.      Vitals:    09/28/23 0950   BP: 122/60   Pulse: 74   Resp: 16   Temp: Toni Samayoa ) 97.1 °F (36.2 °C)   TempSrc: Temporal   SpO2: 95%   Weight: 98.4 kg (217 lb)   Height: 5' 4" (1.626 m)        Physical Exam:   Physical Exam  Constitutional:       Appearance: Normal appearance. HENT:      Head: Normocephalic and atraumatic. Neck:      Vascular: No carotid bruit. Cardiovascular:      Rate and Rhythm: Normal rate and regular rhythm. Pulses: Normal pulses. Heart sounds: Normal heart sounds. Pulmonary:      Effort: Pulmonary effort is normal.      Breath sounds: Normal breath sounds. Musculoskeletal:         General: Normal range of motion. Cervical back: Normal range of motion and neck supple. Right lower leg: No edema. Left lower leg: No edema. Skin:     General: Skin is warm. Neurological:      General: No focal deficit present. Mental Status: She is alert and oriented to person, place, and time. Psychiatric:         Mood and Affect: Mood normal.         Behavior: Behavior normal.         Thought Content:  Thought content normal.         Judgment: Judgment normal.          Rudolph Contreras PA-C

## 2023-11-29 ENCOUNTER — APPOINTMENT (OUTPATIENT)
Dept: LAB | Facility: CLINIC | Age: 68
End: 2023-11-29
Payer: MEDICARE

## 2023-11-29 DIAGNOSIS — E11.9 TYPE 2 DIABETES MELLITUS WITHOUT COMPLICATION, WITHOUT LONG-TERM CURRENT USE OF INSULIN (HCC): ICD-10-CM

## 2023-11-29 LAB
EST. AVERAGE GLUCOSE BLD GHB EST-MCNC: 160 MG/DL
HBA1C MFR BLD: 7.2 %

## 2023-11-29 PROCEDURE — 36415 COLL VENOUS BLD VENIPUNCTURE: CPT

## 2023-11-29 PROCEDURE — 83036 HEMOGLOBIN GLYCOSYLATED A1C: CPT

## 2023-12-06 ENCOUNTER — OFFICE VISIT (OUTPATIENT)
Dept: FAMILY MEDICINE CLINIC | Facility: CLINIC | Age: 68
End: 2023-12-06
Payer: MEDICARE

## 2023-12-06 VITALS
DIASTOLIC BLOOD PRESSURE: 80 MMHG | HEART RATE: 68 BPM | BODY MASS INDEX: 36.74 KG/M2 | SYSTOLIC BLOOD PRESSURE: 128 MMHG | HEIGHT: 64 IN | WEIGHT: 215.2 LBS | RESPIRATION RATE: 16 BRPM

## 2023-12-06 DIAGNOSIS — E66.01 CLASS 2 SEVERE OBESITY DUE TO EXCESS CALORIES WITH SERIOUS COMORBIDITY AND BODY MASS INDEX (BMI) OF 38.0 TO 38.9 IN ADULT: ICD-10-CM

## 2023-12-06 DIAGNOSIS — E11.9 TYPE 2 DIABETES MELLITUS WITHOUT COMPLICATION, WITHOUT LONG-TERM CURRENT USE OF INSULIN (HCC): Primary | ICD-10-CM

## 2023-12-06 DIAGNOSIS — I87.2 VENOUS STASIS DERMATITIS OF BOTH LOWER EXTREMITIES: ICD-10-CM

## 2023-12-06 DIAGNOSIS — E78.2 MIXED HYPERLIPIDEMIA: ICD-10-CM

## 2023-12-06 PROCEDURE — 99214 OFFICE O/P EST MOD 30 MIN: CPT | Performed by: PHYSICIAN ASSISTANT

## 2023-12-06 NOTE — PROGRESS NOTES
Assessment/Plan: 1. Type 2 DM - on Januvia and amaryl again, A1C now 7.2%, continue as is, repeat in 3 months, reminded again of eye doctor. 2. Hyperlipidemia - was on lipitor in past, , will work on diet repeat this in 3 months, if not within range will restart lipitor     3. Morbid obesity - should work on diet, walking     4. Venous stasis dermatitis - discussed pathophysiology, can try compression stockings. Need to work on weight loss     F/u 3 month or sooner if needed           Subjective:   Chief Complaint   Patient presents with    Diabetes      Patient ID: Makeda Wilson is a 76 y.o. female. Patient here for follow up. Back on Januvia and Amaryl with success. No complaints. Still likes her baking but really working on this. Plans to order stockings from [facility].          The following portions of the patient's history were reviewed and updated as appropriate: allergies, current medications, past family history, past medical history, past social history, past surgical history, and problem list.    Past Medical History:   Diagnosis Date    Varicella      Past Surgical History:   Procedure Laterality Date    NO PAST SURGERIES       Family History   Problem Relation Age of Onset    Diabetes Mother     Hyperlipidemia Mother     Hypertension Mother     Atrial fibrillation Mother     Breast cancer Sister     Coronary artery disease Brother         3 stents    Hypertension Brother     Hyperlipidemia Brother     Thyroid cancer Daughter     Lung cancer Maternal Grandfather     Cervical cancer Cousin     Diabetes Father      Social History     Socioeconomic History    Marital status:      Spouse name: Not on file    Number of children: Not on file    Years of education: Not on file    Highest education level: Not on file   Occupational History    Not on file   Tobacco Use    Smoking status: Former     Packs/day: 1.50     Years: 40.00     Total pack years: 60.00     Types: Cigarettes     Quit date: 10/26/2012     Years since quittin.1    Smokeless tobacco: Never   Vaping Use    Vaping Use: Never used   Substance and Sexual Activity    Alcohol use: Yes     Comment: occasionally    Drug use: Never    Sexual activity: Not on file   Other Topics Concern    Not on file   Social History Narrative        1 daughter    Lives with her daughter and mother    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     Social Determinants of Health     Financial Resource Strain: Low Risk  (2023)    Overall Financial Resource Strain (CARDIA)     Difficulty of Paying Living Expenses: Not hard at all   Food Insecurity: No Food Insecurity (2021)    Hunger Vital Sign     Worried About Running Out of Food in the Last Year: Never true     Ran Out of Food in the Last Year: Never true   Transportation Needs: No Transportation Needs (2023)    PRAPARE - Transportation     Lack of Transportation (Medical): No     Lack of Transportation (Non-Medical):  No   Physical Activity: Inactive (2021)    Exercise Vital Sign     Days of Exercise per Week: 0 days     Minutes of Exercise per Session: 0 min   Stress: Stress Concern Present (2021)    [address] [facility]     Feeling of Stress : Rather much   Social Connections: Not on file   Intimate Partner Violence: Not on file   Housing Stability: [address]  (2021)    Housing Stability Vital Sign     Unable to Pay for Housing in the Last Year: No     Number of Places Lived in the Last Year: 1     Unstable Housing in the Last Year: No       Current Outpatient Medications:     Blood Glucose Monitoring Suppl (OneTouch Verio Flex System) w/Device KIT, Use as directed, Disp: , Rfl:     glimepiride (AMARYL) 1 mg tablet, Take 1 tablet (1 mg total) by mouth daily with breakfast, Disp: 90 tablet, Rfl: 0    glucose blood (OneTouch Verio) test strip, Test once daily, Disp: 100 strip, Rfl: 1    Lancets (OneTouch Delica Plus [id number]) MISC, USE TO TEST ONCE DAILY, Disp: 100 each, Rfl: 1    sitaGLIPtin (Januvia) 100 mg tablet, Take 1 tablet (100 mg total) by mouth daily, Disp: 90 tablet, Rfl: 0    Review of Systems   Constitutional:  Negative for chills and fever. HENT:  Negative for ear pain and sore throat. Eyes:  Negative for pain and visual disturbance. Respiratory:  Negative for cough and shortness of breath. Cardiovascular:  Negative for chest pain and palpitations. Gastrointestinal:  Negative for abdominal pain and vomiting. Genitourinary:  Negative for dysuria and hematuria. Musculoskeletal:  Negative for arthralgias and back pain. Skin:  Negative for color change and rash. Neurological:  Negative for seizures and syncope. All other systems reviewed and are negative. Objective:    Vitals:    12/06/23 1208   BP: 128/80   Pulse: 68   Resp: 16   Weight: 97.6 kg (215 lb 3.2 oz)   Height: 5' 4" (1.626 m)        Physical Exam  Constitutional:       Appearance: Normal appearance. She is well-developed and normal weight. HENT:      Head: Normocephalic and atraumatic. Neck:      Vascular: No carotid bruit. Cardiovascular:      Rate and Rhythm: Normal rate and regular rhythm. Pulses: Normal pulses. Heart sounds: Normal heart sounds. Pulmonary:      Effort: Pulmonary effort is normal.      Breath sounds: Normal breath sounds. Musculoskeletal:         General: Normal range of motion. Cervical back: Normal range of motion and neck supple. Right lower leg: No edema. Left lower leg: No edema. Skin:     General: Skin is warm. Neurological:      General: No focal deficit present. Mental Status: She is alert and oriented to person, place, and time. Psychiatric:         Mood and Affect: Mood normal.         Behavior: Behavior normal.         Thought Content:  Thought content normal.         Judgment: Judgment normal.

## 2023-12-11 PROBLEM — R06.09 DYSPNEA ON EXERTION: Status: RESOLVED | Noted: 2020-08-25 | Resolved: 2023-12-11

## 2023-12-11 PROBLEM — I87.2 VENOUS STASIS DERMATITIS OF BOTH LOWER EXTREMITIES: Status: ACTIVE | Noted: 2023-12-11

## 2024-02-16 DIAGNOSIS — E11.65 UNCONTROLLED TYPE 2 DIABETES MELLITUS WITH HYPERGLYCEMIA (HCC): ICD-10-CM

## 2024-02-16 DIAGNOSIS — E11.9 TYPE 2 DIABETES MELLITUS WITHOUT COMPLICATION, WITHOUT LONG-TERM CURRENT USE OF INSULIN (HCC): ICD-10-CM

## 2024-02-16 RX ORDER — GLIMEPIRIDE 1 MG/1
1 TABLET ORAL
Qty: 90 TABLET | Refills: 0 | Status: SHIPPED | OUTPATIENT
Start: 2024-02-16 | End: 2024-02-20

## 2024-02-16 RX ORDER — BLOOD SUGAR DIAGNOSTIC
STRIP MISCELLANEOUS
Qty: 100 STRIP | Refills: 0 | Status: SHIPPED | OUTPATIENT
Start: 2024-02-16 | End: 2024-02-20

## 2024-02-16 RX ORDER — LANCETS 33 GAUGE
EACH MISCELLANEOUS
Qty: 100 EACH | Refills: 0 | Status: SHIPPED | OUTPATIENT
Start: 2024-02-16

## 2024-02-19 DIAGNOSIS — E11.65 UNCONTROLLED TYPE 2 DIABETES MELLITUS WITH HYPERGLYCEMIA (HCC): ICD-10-CM

## 2024-02-19 DIAGNOSIS — E11.9 TYPE 2 DIABETES MELLITUS WITHOUT COMPLICATION, WITHOUT LONG-TERM CURRENT USE OF INSULIN (HCC): ICD-10-CM

## 2024-02-20 RX ORDER — BLOOD SUGAR DIAGNOSTIC
STRIP MISCELLANEOUS
Qty: 100 STRIP | Refills: 0 | Status: SHIPPED | OUTPATIENT
Start: 2024-02-20

## 2024-02-20 RX ORDER — GLIMEPIRIDE 1 MG/1
1 TABLET ORAL
Qty: 90 TABLET | Refills: 0 | Status: SHIPPED | OUTPATIENT
Start: 2024-02-20

## 2024-03-13 ENCOUNTER — APPOINTMENT (OUTPATIENT)
Dept: LAB | Facility: CLINIC | Age: 69
End: 2024-03-13
Payer: MEDICARE

## 2024-03-13 DIAGNOSIS — E11.9 TYPE 2 DIABETES MELLITUS WITHOUT COMPLICATION, WITHOUT LONG-TERM CURRENT USE OF INSULIN (HCC): ICD-10-CM

## 2024-03-13 LAB
ALBUMIN SERPL BCP-MCNC: 3.8 G/DL (ref 3.5–5)
ALP SERPL-CCNC: 125 U/L (ref 34–104)
ALT SERPL W P-5'-P-CCNC: 18 U/L (ref 7–52)
ANION GAP SERPL CALCULATED.3IONS-SCNC: 8 MMOL/L (ref 4–13)
AST SERPL W P-5'-P-CCNC: 16 U/L (ref 13–39)
BILIRUB SERPL-MCNC: 0.34 MG/DL (ref 0.2–1)
BUN SERPL-MCNC: 17 MG/DL (ref 5–25)
CALCIUM SERPL-MCNC: 9.2 MG/DL (ref 8.4–10.2)
CHLORIDE SERPL-SCNC: 102 MMOL/L (ref 96–108)
CHOLEST SERPL-MCNC: 203 MG/DL
CO2 SERPL-SCNC: 29 MMOL/L (ref 21–32)
CREAT SERPL-MCNC: 0.75 MG/DL (ref 0.6–1.3)
CREAT UR-MCNC: 96.3 MG/DL
EST. AVERAGE GLUCOSE BLD GHB EST-MCNC: 148 MG/DL
GFR SERPL CREATININE-BSD FRML MDRD: 82 ML/MIN/1.73SQ M
GLUCOSE P FAST SERPL-MCNC: 145 MG/DL (ref 65–99)
HBA1C MFR BLD: 6.8 %
HDLC SERPL-MCNC: 59 MG/DL
LDLC SERPL CALC-MCNC: 121 MG/DL (ref 0–100)
MICROALBUMIN UR-MCNC: 14.8 MG/L
MICROALBUMIN/CREAT 24H UR: 15 MG/G CREATININE (ref 0–30)
POTASSIUM SERPL-SCNC: 4.6 MMOL/L (ref 3.5–5.3)
PROT SERPL-MCNC: 7.3 G/DL (ref 6.4–8.4)
SODIUM SERPL-SCNC: 139 MMOL/L (ref 135–147)
TRIGL SERPL-MCNC: 114 MG/DL

## 2024-03-13 PROCEDURE — 36415 COLL VENOUS BLD VENIPUNCTURE: CPT

## 2024-03-13 PROCEDURE — 80061 LIPID PANEL: CPT

## 2024-03-13 PROCEDURE — 82570 ASSAY OF URINE CREATININE: CPT

## 2024-03-13 PROCEDURE — 83036 HEMOGLOBIN GLYCOSYLATED A1C: CPT

## 2024-03-13 PROCEDURE — 82043 UR ALBUMIN QUANTITATIVE: CPT

## 2024-03-13 PROCEDURE — 80053 COMPREHEN METABOLIC PANEL: CPT

## 2024-03-27 ENCOUNTER — OFFICE VISIT (OUTPATIENT)
Dept: FAMILY MEDICINE CLINIC | Facility: CLINIC | Age: 69
End: 2024-03-27
Payer: MEDICARE

## 2024-03-27 VITALS
HEART RATE: 73 BPM | RESPIRATION RATE: 17 BRPM | TEMPERATURE: 97.8 F | BODY MASS INDEX: 38.39 KG/M2 | OXYGEN SATURATION: 97 % | DIASTOLIC BLOOD PRESSURE: 82 MMHG | SYSTOLIC BLOOD PRESSURE: 124 MMHG | HEIGHT: 64 IN | WEIGHT: 224.9 LBS

## 2024-03-27 DIAGNOSIS — E11.65 UNCONTROLLED TYPE 2 DIABETES MELLITUS WITH HYPERGLYCEMIA (HCC): Primary | ICD-10-CM

## 2024-03-27 DIAGNOSIS — I87.2 VENOUS STASIS DERMATITIS OF BOTH LOWER EXTREMITIES: ICD-10-CM

## 2024-03-27 DIAGNOSIS — E66.01 CLASS 2 SEVERE OBESITY DUE TO EXCESS CALORIES WITH SERIOUS COMORBIDITY AND BODY MASS INDEX (BMI) OF 38.0 TO 38.9 IN ADULT (HCC): ICD-10-CM

## 2024-03-27 DIAGNOSIS — E11.9 TYPE 2 DIABETES MELLITUS WITHOUT COMPLICATION, WITHOUT LONG-TERM CURRENT USE OF INSULIN (HCC): ICD-10-CM

## 2024-03-27 DIAGNOSIS — E78.2 MIXED HYPERLIPIDEMIA: ICD-10-CM

## 2024-03-27 PROCEDURE — G2211 COMPLEX E/M VISIT ADD ON: HCPCS | Performed by: PHYSICIAN ASSISTANT

## 2024-03-27 PROCEDURE — 99214 OFFICE O/P EST MOD 30 MIN: CPT | Performed by: PHYSICIAN ASSISTANT

## 2024-03-27 NOTE — PROGRESS NOTES
Assessment/Plan:    1.Type 2 DM - A1C 6.8%, -120 in morning, on Januvia and amaryl but seems to feel shaky in morning but does not eat much,  will take it with lunch and see how she feels, repeat A1C in 6 months. Continue to make diet changes. Needs eye doctor appt.    2. Hyperlipidemia - refusing lipitor, , will work on diet repeat this in 6 months     3. Morbid obesity - should work on diet, walking     4. Venous stasis dermatitis -  continue compression stockings. Need to work on weight loss     F/u 3 month or sooner if needed     Subjective:   Chief Complaint   Patient presents with    Diabetes    Hyperlipidemia    Obesity     3 month recheck and review blood work      Patient ID: Carey Mcdaniel is a 68 y.o. female.    Patient checking sugars -120. Stopped the januvia because fasting sugars were 70 and she was feeling shaky in morning. Admits she does the majority of her eating from lunch through evening. Does not eat much in the morning, maybe coffee and something small she picks at.  Walking more with dog. Wearing her compressions stockings daily.  Had labs done for review.        The following portions of the patient's history were reviewed and updated as appropriate: allergies, current medications, past family history, past medical history, past social history, past surgical history, and problem list.    Past Medical History:   Diagnosis Date    Diabetes mellitus (HCC) 2020    HL (hearing loss) 2020    Obesity 2020    Varicella     Visual impairment 2020     Past Surgical History:   Procedure Laterality Date    NO PAST SURGERIES       Family History   Problem Relation Age of Onset    Diabetes Mother     Hyperlipidemia Mother     Hypertension Mother     Atrial fibrillation Mother     Hearing loss Mother     Breast cancer Sister     Coronary artery disease Brother         3 stents    Hypertension Brother     Hyperlipidemia Brother     Hearing loss Brother     Thyroid cancer Daughter      Thyroid disease Daughter     Cancer Daughter     Lung cancer Maternal Grandfather     Cervical cancer Cousin     Diabetes Father     Cancer Cousin      Social History     Socioeconomic History    Marital status:      Spouse name: Not on file    Number of children: Not on file    Years of education: Not on file    Highest education level: Not on file   Occupational History    Not on file   Tobacco Use    Smoking status: Former     Current packs/day: 0.00     Average packs/day: 1.5 packs/day for 40.0 years (60.0 ttl pk-yrs)     Types: Cigarettes     Start date: 10/26/1972     Quit date: 10/26/2012     Years since quittin.4    Smokeless tobacco: Never   Vaping Use    Vaping status: Never Used   Substance and Sexual Activity    Alcohol use: Yes     Alcohol/week: 1.0 standard drink of alcohol     Types: 1 Glasses of wine per week     Comment: occasionally    Drug use: Never    Sexual activity: Not Currently     Partners: Male   Other Topics Concern    Not on file   Social History Narrative        1 daughter    Lives with her daughter and mother    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     Social Determinants of Health     Financial Resource Strain: Low Risk  (2023)    Overall Financial Resource Strain (CARDIA)     Difficulty of Paying Living Expenses: Not hard at all   Food Insecurity: No Food Insecurity (2021)    Hunger Vital Sign     Worried About Running Out of Food in the Last Year: Never true     Ran Out of Food in the Last Year: Never true   Transportation Needs: No Transportation Needs (2023)    PRAPARE - Transportation     Lack of Transportation (Medical): No     Lack of Transportation (Non-Medical): No   Physical Activity: Inactive (2021)    Exercise Vital Sign     Days of Exercise per Week: 0 days     Minutes of Exercise per Session: 0 min   Stress: Stress Concern Present (2021)    [employer] [place] [employer] - Occupational Stress Questionnaire     Feeling of  "Stress : Rather much   Social Connections: Not on file   Intimate Partner Violence: Not on file   Housing Stability: Low Risk  (7/12/2021)    Housing Stability Vital Sign     Unable to Pay for Housing in the Last Year: No     Number of Places Lived in the Last Year: 1     Unstable Housing in the Last Year: No       Current Outpatient Medications:     Blood Glucose Monitoring Suppl (OneTouch Verio Flex System) w/Device KIT, Use as directed, Disp: , Rfl:     glimepiride (AMARYL) 1 mg tablet, TAKE ONE TABLET BY MOUTH EVERY DAY WITH BREAKFAST, Disp: 90 tablet, Rfl: 0    Lancets (OneTouch Delica Plus [id number]) MISC, Check sugars daily, Disp: 100 each, Rfl: 0    OneTouch Verio test strip, TEST ONCE DAILY, Disp: 100 strip, Rfl: 0    sitaGLIPtin (Januvia) 100 mg tablet, Take 1 tablet (100 mg total) by mouth daily (Patient not taking: Reported on 3/27/2024), Disp: 90 tablet, Rfl: 0    Review of Systems          Objective:    Vitals:    03/27/24 1213   BP: 124/82   Pulse: 73   Resp: 17   Temp: 97.8 °F (36.6 °C)   SpO2: 97%   Weight: 102 kg (224 lb 14.4 oz)   Height: 5' 4\" (1.626 m)        Physical Exam  Constitutional:       Appearance: Normal appearance. She is well-developed and normal weight.   HENT:      Head: Normocephalic and atraumatic.   Neck:      Vascular: No carotid bruit.   Cardiovascular:      Rate and Rhythm: Normal rate and regular rhythm.      Pulses: Normal pulses.      Heart sounds: Normal heart sounds.   Pulmonary:      Effort: Pulmonary effort is normal.      Breath sounds: Normal breath sounds.   Musculoskeletal:         General: Normal range of motion.      Cervical back: Normal range of motion and neck supple.      Right lower leg: Edema present.      Left lower leg: Edema present.      Comments: B/l trace edema   Skin:     General: Skin is warm.   Neurological:      General: No focal deficit present.      Mental Status: She is alert and oriented to person, place, and time.   Psychiatric:         Mood " and Affect: Mood normal.         Behavior: Behavior normal.         Thought Content: Thought content normal.         Judgment: Judgment normal.

## 2024-04-15 ENCOUNTER — OFFICE VISIT (OUTPATIENT)
Dept: FAMILY MEDICINE CLINIC | Facility: CLINIC | Age: 69
End: 2024-04-15
Payer: MEDICARE

## 2024-04-15 VITALS
WEIGHT: 224 LBS | RESPIRATION RATE: 16 BRPM | HEIGHT: 64 IN | BODY MASS INDEX: 38.24 KG/M2 | HEART RATE: 74 BPM | OXYGEN SATURATION: 98 % | SYSTOLIC BLOOD PRESSURE: 124 MMHG | TEMPERATURE: 98 F | DIASTOLIC BLOOD PRESSURE: 60 MMHG

## 2024-04-15 DIAGNOSIS — N76.0 ACUTE VAGINITIS: ICD-10-CM

## 2024-04-15 DIAGNOSIS — B02.9 HERPES ZOSTER WITHOUT COMPLICATION: Primary | ICD-10-CM

## 2024-04-15 PROCEDURE — G2211 COMPLEX E/M VISIT ADD ON: HCPCS | Performed by: PHYSICIAN ASSISTANT

## 2024-04-15 PROCEDURE — 99213 OFFICE O/P EST LOW 20 MIN: CPT | Performed by: PHYSICIAN ASSISTANT

## 2024-04-15 RX ORDER — PREDNISONE 10 MG/1
TABLET ORAL
Qty: 20 TABLET | Refills: 0 | Status: SHIPPED | OUTPATIENT
Start: 2024-04-15

## 2024-04-15 RX ORDER — FLUCONAZOLE 150 MG/1
TABLET ORAL
Qty: 2 TABLET | Refills: 0 | Status: SHIPPED | OUTPATIENT
Start: 2024-04-15 | End: 2024-04-18

## 2024-04-15 RX ORDER — VALACYCLOVIR HYDROCHLORIDE 1 G/1
1000 TABLET, FILM COATED ORAL 3 TIMES DAILY
Qty: 21 TABLET | Refills: 0 | Status: SHIPPED | OUTPATIENT
Start: 2024-04-15 | End: 2024-04-22

## 2024-04-15 NOTE — PROGRESS NOTES
Assessment/Plan:      Herpes zoster - will treat with valtrex and prednisone to help resolve pain and because new lesions are appearing still 7 days in. Reassurance, tylenol prn for pain    F/u as scheduled and as needed      Subjective:   Chief Complaint   Patient presents with    Rash     Rash on right arm  Painful, chills, fever  Started about a week and a half ago      Patient ID: Carey Mcdaniel is a 68 y.o. female.    Patient noticed pain in right side of neck 10 days ago, thought she slept wrong. Then started to noticed small red bumps showing up on arm. Itchy but burn. Has feel off, low grade fever. Taking Tylenol for pain with relief but pain returns when Tylenol wears off. Some bumps already scabbed.     Rash  This is a new problem. The problem has been gradually worsening since onset. The affected locations include the face, right shoulder and right arm. The rash is characterized by burning, pain, redness and itchiness. It is a spider bite and unknown if there was an exposure to a precipitant. She was exposed to a spider bite and unknown. Associated symptoms include coughing, diarrhea, fatigue, a fever, joint pain and shortness of breath.       The following portions of the patient's history were reviewed and updated as appropriate: allergies, current medications, past family history, past medical history, past social history, past surgical history, and problem list.    Past Medical History:   Diagnosis Date    Diabetes mellitus (HCC) 2020    HL (hearing loss) 2020    Obesity 2020    Varicella     Visual impairment 2020     Past Surgical History:   Procedure Laterality Date    NO PAST SURGERIES       Family History   Problem Relation Age of Onset    Diabetes Mother     Hyperlipidemia Mother     Hypertension Mother     Atrial fibrillation Mother     Hearing loss Mother     Breast cancer Sister     Coronary artery disease Brother         3 stents    Hypertension Brother     Hyperlipidemia Brother     Hearing  loss Brother     Thyroid cancer Daughter     Thyroid disease Daughter     Cancer Daughter     Lung cancer Maternal Grandfather     Cervical cancer Cousin     Diabetes Father     Cancer Cousin      Social History     Socioeconomic History    Marital status:      Spouse name: Not on file    Number of children: Not on file    Years of education: Not on file    Highest education level: Not on file   Occupational History    Not on file   Tobacco Use    Smoking status: Former     Current packs/day: 0.00     Average packs/day: 1.5 packs/day for 40.0 years (60.0 ttl pk-yrs)     Types: Cigarettes     Start date: 10/26/1972     Quit date: 10/26/2012     Years since quittin.4    Smokeless tobacco: Never   Vaping Use    Vaping status: Never Used   Substance and Sexual Activity    Alcohol use: Yes     Alcohol/week: 1.0 standard drink of alcohol     Types: 1 Glasses of wine per week     Comment: occasionally    Drug use: Never    Sexual activity: Not Currently     Partners: Male   Other Topics Concern    Not on file   Social History Narrative        1 daughter    Lives with her daughter and mother    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     Social Determinants of Health     Financial Resource Strain: Low Risk  (2023)    Overall Financial Resource Strain (CARDIA)     Difficulty of Paying Living Expenses: Not hard at all   Food Insecurity: No Food Insecurity (2021)    Hunger Vital Sign     Worried About Running Out of Food in the Last Year: Never true     Ran Out of Food in the Last Year: Never true   Transportation Needs: No Transportation Needs (2023)    PRAPARE - Transportation     Lack of Transportation (Medical): No     Lack of Transportation (Non-Medical): No   Physical Activity: Inactive (2021)    Exercise Vital Sign     Days of Exercise per Week: 0 days     Minutes of Exercise per Session: 0 min   Stress: Stress Concern Present (2021)    [employer] -  "Occupational Stress Questionnaire     Feeling of Stress : Rather much   Social Connections: Not on file   Intimate Partner Violence: Not on file   Housing Stability: Low Risk  (7/12/2021)    Housing Stability Vital Sign     Unable to Pay for Housing in the Last Year: No     Number of Places Lived in the Last Year: 1     Unstable Housing in the Last Year: No       Current Outpatient Medications:     Blood Glucose Monitoring Suppl (OneTouch Verio Flex System) w/Device KIT, Use as directed, Disp: , Rfl:     glimepiride (AMARYL) 1 mg tablet, TAKE ONE TABLET BY MOUTH EVERY DAY WITH BREAKFAST, Disp: 90 tablet, Rfl: 0    Lancets (OneTouch Delica Plus Iholya22B) MISC, Check sugars daily, Disp: 100 each, Rfl: 0    OneTouch Verio test strip, TEST ONCE DAILY, Disp: 100 strip, Rfl: 0    sitaGLIPtin (Januvia) 100 mg tablet, Take 1 tablet (100 mg total) by mouth daily (Patient not taking: Reported on 3/27/2024), Disp: 90 tablet, Rfl: 0    Review of Systems   Constitutional:  Positive for fatigue and fever.   Eyes:  Positive for pain.   Respiratory:  Positive for cough and shortness of breath.    Gastrointestinal:  Positive for diarrhea.   Musculoskeletal:  Positive for joint pain.   Skin:  Positive for rash.             Objective:    Vitals:    04/15/24 1128   BP: 124/60   Pulse: 74   Resp: 16   Temp: 98 °F (36.7 °C)   TempSrc: Temporal   SpO2: 98%   Weight: 102 kg (224 lb)   Height: 5' 4\" (1.626 m)        Physical Exam  Constitutional:       Appearance: Normal appearance.   HENT:      Head: Normocephalic and atraumatic.   Skin:     General: Skin is warm.      Comments: Right arm with multiple excoriated papular lesions, two with vesicles. Scattered.    Neurological:      General: No focal deficit present.      Mental Status: She is alert and oriented to person, place, and time.   Psychiatric:         Mood and Affect: Mood normal.         Behavior: Behavior normal.         Thought Content: Thought content normal.         " Judgment: Judgment normal.

## 2024-06-16 DIAGNOSIS — E11.65 UNCONTROLLED TYPE 2 DIABETES MELLITUS WITH HYPERGLYCEMIA (HCC): ICD-10-CM

## 2024-06-16 DIAGNOSIS — E11.9 TYPE 2 DIABETES MELLITUS WITHOUT COMPLICATION, WITHOUT LONG-TERM CURRENT USE OF INSULIN (HCC): ICD-10-CM

## 2024-06-16 RX ORDER — GLIMEPIRIDE 1 MG/1
1 TABLET ORAL
Qty: 90 TABLET | Refills: 1 | Status: SHIPPED | OUTPATIENT
Start: 2024-06-16

## 2024-06-16 RX ORDER — BLOOD SUGAR DIAGNOSTIC
STRIP MISCELLANEOUS
Qty: 100 STRIP | Refills: 1 | Status: SHIPPED | OUTPATIENT
Start: 2024-06-16

## 2024-11-07 ENCOUNTER — RA CDI HCC (OUTPATIENT)
Dept: OTHER | Facility: HOSPITAL | Age: 69
End: 2024-11-07

## 2024-11-25 DIAGNOSIS — E11.9 TYPE 2 DIABETES MELLITUS WITHOUT COMPLICATION, WITHOUT LONG-TERM CURRENT USE OF INSULIN (HCC): ICD-10-CM

## 2024-11-26 RX ORDER — GLIMEPIRIDE 1 MG/1
1 TABLET ORAL
Qty: 90 TABLET | Refills: 1 | Status: SHIPPED | OUTPATIENT
Start: 2024-11-26

## 2024-12-12 ENCOUNTER — OFFICE VISIT (OUTPATIENT)
Dept: FAMILY MEDICINE CLINIC | Facility: CLINIC | Age: 69
End: 2024-12-12
Payer: MEDICARE

## 2024-12-12 VITALS
HEART RATE: 67 BPM | WEIGHT: 224 LBS | DIASTOLIC BLOOD PRESSURE: 70 MMHG | SYSTOLIC BLOOD PRESSURE: 126 MMHG | RESPIRATION RATE: 16 BRPM | HEIGHT: 64 IN | BODY MASS INDEX: 38.24 KG/M2 | OXYGEN SATURATION: 97 % | TEMPERATURE: 97.7 F

## 2024-12-12 DIAGNOSIS — E11.9 CONTROLLED TYPE 2 DIABETES MELLITUS WITHOUT COMPLICATION, WITHOUT LONG-TERM CURRENT USE OF INSULIN (HCC): ICD-10-CM

## 2024-12-12 DIAGNOSIS — E66.812 CLASS 2 SEVERE OBESITY DUE TO EXCESS CALORIES WITH SERIOUS COMORBIDITY AND BODY MASS INDEX (BMI) OF 38.0 TO 38.9 IN ADULT (HCC): ICD-10-CM

## 2024-12-12 DIAGNOSIS — Z12.31 ENCOUNTER FOR SCREENING MAMMOGRAM FOR BREAST CANCER: ICD-10-CM

## 2024-12-12 DIAGNOSIS — Z00.00 MEDICARE ANNUAL WELLNESS VISIT, SUBSEQUENT: Primary | ICD-10-CM

## 2024-12-12 DIAGNOSIS — E78.2 MIXED HYPERLIPIDEMIA: ICD-10-CM

## 2024-12-12 DIAGNOSIS — E66.01 CLASS 2 SEVERE OBESITY DUE TO EXCESS CALORIES WITH SERIOUS COMORBIDITY AND BODY MASS INDEX (BMI) OF 38.0 TO 38.9 IN ADULT (HCC): ICD-10-CM

## 2024-12-12 LAB — SL AMB POCT HEMOGLOBIN AIC: 6.8 (ref ?–6.5)

## 2024-12-12 PROCEDURE — 83036 HEMOGLOBIN GLYCOSYLATED A1C: CPT | Performed by: PHYSICIAN ASSISTANT

## 2024-12-12 PROCEDURE — G0439 PPPS, SUBSEQ VISIT: HCPCS | Performed by: PHYSICIAN ASSISTANT

## 2024-12-12 PROCEDURE — 99214 OFFICE O/P EST MOD 30 MIN: CPT | Performed by: PHYSICIAN ASSISTANT

## 2024-12-12 NOTE — PATIENT INSTRUCTIONS
Medicare Preventive Visit Patient Instructions  Thank you for completing your Welcome to Medicare Visit or Medicare Annual Wellness Visit today. Your next wellness visit will be due in one year (12/13/2025).  The screening/preventive services that you may require over the next 5-10 years are detailed below. Some tests may not apply to you based off risk factors and/or age. Screening tests ordered at today's visit but not completed yet may show as past due. Also, please note that scanned in results may not display below.  Preventive Screenings:  Service Recommendations Previous Testing/Comments   Colorectal Cancer Screening  * Colonoscopy    * Fecal Occult Blood Test (FOBT)/Fecal Immunochemical Test (FIT)  * Fecal DNA/Cologuard Test  * Flexible Sigmoidoscopy Age: 45-75 years old   Colonoscopy: every 10 years (may be performed more frequently if at higher risk)  OR  FOBT/FIT: every 1 year  OR  Cologuard: every 3 years  OR  Sigmoidoscopy: every 5 years  Screening may be recommended earlier than age 45 if at higher risk for colorectal cancer. Also, an individualized decision between you and your healthcare provider will decide whether screening between the ages of 76-85 would be appropriate. Colonoscopy: 10/12/2018  FOBT/FIT: Not on file  Cologuard: Not on file  Sigmoidoscopy: Not on file    Screening Current     Breast Cancer Screening Age: 40+ years old  Frequency: every 1-2 years  Not required if history of left and right mastectomy Mammogram: 08/04/2020        Cervical Cancer Screening Between the ages of 21-29, pap smear recommended once every 3 years.   Between the ages of 30-65, can perform pap smear with HPV co-testing every 5 years.   Recommendations may differ for women with a history of total hysterectomy, cervical cancer, or abnormal pap smears in past. Pap Smear: 09/07/2018    Screening Not Indicated   Hepatitis C Screening Once for adults born between 1945 and 1965  More frequently in patients at high risk  for Hepatitis C Hep C Antibody: 08/14/2018    Screening Current   Diabetes Screening 1-2 times per year if you're at risk for diabetes or have pre-diabetes Fasting glucose: 145 mg/dL (3/13/2024)  A1C: 6.8 (12/12/2024)  Screening Not Indicated  History Diabetes   Cholesterol Screening Once every 5 years if you don't have a lipid disorder. May order more often based on risk factors. Lipid panel: 03/13/2024    Screening Not Indicated  History Lipid Disorder     Other Preventive Screenings Covered by Medicare:  Abdominal Aortic Aneurysm (AAA) Screening: covered once if your at risk. You're considered to be at risk if you have a family history of AAA.  Lung Cancer Screening: covers low dose CT scan once per year if you meet all of the following conditions: (1) Age 55-77; (2) No signs or symptoms of lung cancer; (3) Current smoker or have quit smoking within the last 15 years; (4) You have a tobacco smoking history of at least 20 pack years (packs per day multiplied by number of years you smoked); (5) You get a written order from a healthcare provider.  Glaucoma Screening: covered annually if you're considered high risk: (1) You have diabetes OR (2) Family history of glaucoma OR (3)  aged 50 and older OR (4)  American aged 65 and older  Osteoporosis Screening: covered every 2 years if you meet one of the following conditions: (1) You're estrogen deficient and at risk for osteoporosis based off medical history and other findings; (2) Have a vertebral abnormality; (3) On glucocorticoid therapy for more than 3 months; (4) Have primary hyperparathyroidism; (5) On osteoporosis medications and need to assess response to drug therapy.   Last bone density test (DXA Scan): Not on file.  HIV Screening: covered annually if you're between the age of 15-65. Also covered annually if you are younger than 15 and older than 65 with risk factors for HIV infection. For pregnant patients, it is covered up to 3 times  per pregnancy.    Immunizations:  Immunization Recommendations   Influenza Vaccine Annual influenza vaccination during flu season is recommended for all persons aged >= 6 months who do not have contraindications   Pneumococcal Vaccine   * Pneumococcal conjugate vaccine = PCV13 (Prevnar 13), PCV15 (Vaxneuvance), PCV20 (Prevnar 20)  * Pneumococcal polysaccharide vaccine = PPSV23 (Pneumovax) Adults 19-63 yo with certain risk factors or if 65+ yo  If never received any pneumonia vaccine: recommend Prevnar 20 (PCV20)  Give PCV20 if previously received 1 dose of PCV13 or PPSV23   Hepatitis B Vaccine 3 dose series if at intermediate or high risk (ex: diabetes, end stage renal disease, liver disease)   Respiratory syncytial virus (RSV) Vaccine - COVERED BY MEDICARE PART D  * RSVPreF3 (Arexvy) CDC recommends that adults 60 years of age and older may receive a single dose of RSV vaccine using shared clinical decision-making (SCDM)   Tetanus (Td) Vaccine - COST NOT COVERED BY MEDICARE PART B Following completion of primary series, a booster dose should be given every 10 years to maintain immunity against tetanus. Td may also be given as tetanus wound prophylaxis.   Tdap Vaccine - COST NOT COVERED BY MEDICARE PART B Recommended at least once for all adults. For pregnant patients, recommended with each pregnancy.   Shingles Vaccine (Shingrix) - COST NOT COVERED BY MEDICARE PART B  2 shot series recommended in those 19 years and older who have or will have weakened immune systems or those 50 years and older     Health Maintenance Due:      Topic Date Due   • Lung Cancer Screening  06/07/2022   • Breast Cancer Screening: Mammogram  08/04/2022   • Colorectal Cancer Screening  10/12/2028   • Hepatitis C Screening  Completed   • Cervical Cancer Screening  Discontinued     Immunizations Due:      Topic Date Due   • Pneumococcal Vaccine: 65+ Years (1 of 2 - PCV) Never done   • Influenza Vaccine (1) 09/01/2024   • COVID-19 Vaccine (3 -  2024-25 season) 09/01/2024     Advance Directives   What are advance directives?  Advance directives are legal documents that state your wishes and plans for medical care. These plans are made ahead of time in case you lose your ability to make decisions for yourself. Advance directives can apply to any medical decision, such as the treatments you want, and if you want to donate organs.   What are the types of advance directives?  There are many types of advance directives, and each state has rules about how to use them. You may choose a combination of any of the following:  Living will:  This is a written record of the treatment you want. You can also choose which treatments you do not want, which to limit, and which to stop at a certain time. This includes surgery, medicine, IV fluid, and tube feedings.   Durable power of  for healthcare (DPAHC):  This is a written record that states who you want to make healthcare choices for you when you are unable to make them for yourself. This person, called a proxy, is usually a family member or a friend. You may choose more than 1 proxy.  Do not resuscitate (DNR) order:  A DNR order is used in case your heart stops beating or you stop breathing. It is a request not to have certain forms of treatment, such as CPR. A DNR order may be included in other types of advance directives.  Medical directive:  This covers the care that you want if you are in a coma, near death, or unable to make decisions for yourself. You can list the treatments you want for each condition. Treatment may include pain medicine, surgery, blood transfusions, dialysis, IV or tube feedings, and a ventilator (breathing machine).  Values history:  This document has questions about your views, beliefs, and how you feel and think about life. This information can help others choose the care that you would choose.  Why are advance directives important?  An advance directive helps you control your care.  Although spoken wishes may be used, it is better to have your wishes written down. Spoken wishes can be misunderstood, or not followed. Treatments may be given even if you do not want them. An advance directive may make it easier for your family to make difficult choices about your care.   Weight Management   Why it is important to manage your weight:  Being overweight increases your risk of health conditions such as heart disease, high blood pressure, type 2 diabetes, and certain types of cancer. It can also increase your risk for osteoarthritis, sleep apnea, and other respiratory problems. Aim for a slow, steady weight loss. Even a small amount of weight loss can lower your risk of health problems.  How to lose weight safely:  A safe and healthy way to lose weight is to eat fewer calories and get regular exercise. You can lose up about 1 pound a week by decreasing the number of calories you eat by 500 calories each day.   Healthy meal plan for weight management:  A healthy meal plan includes a variety of foods, contains fewer calories, and helps you stay healthy. A healthy meal plan includes the following:  Eat whole-grain foods more often.  A healthy meal plan should contain fiber. Fiber is the part of grains, fruits, and vegetables that is not broken down by your body. Whole-grain foods are healthy and provide extra fiber in your diet. Some examples of whole-grain foods are whole-wheat breads and pastas, oatmeal, brown rice, and bulgur.  Eat a variety of vegetables every day.  Include dark, leafy greens such as spinach, kale, cassie greens, and mustard greens. Eat yellow and orange vegetables such as carrots, sweet potatoes, and winter squash.   Eat a variety of fruits every day.  Choose fresh or canned fruit (canned in its own juice or light syrup) instead of juice. Fruit juice has very little or no fiber.  Eat low-fat dairy foods.  Drink fat-free (skim) milk or 1% milk. Eat fat-free yogurt and low-fat cottage  cheese. Try low-fat cheeses such as mozzarella and other reduced-fat cheeses.  Choose meat and other protein foods that are low in fat.  Choose beans or other legumes such as split peas or lentils. Choose fish, skinless poultry (chicken or turkey), or lean cuts of red meat (beef or pork). Before you cook meat or poultry, cut off any visible fat.   Use less fat and oil.  Try baking foods instead of frying them. Add less fat, such as margarine, sour cream, regular salad dressing and mayonnaise to foods. Eat fewer high-fat foods. Some examples of high-fat foods include french fries, doughnuts, ice cream, and cakes.  Eat fewer sweets.  Limit foods and drinks that are high in sugar. This includes candy, cookies, regular soda, and sweetened drinks.  Exercise:  Exercise at least 30 minutes per day on most days of the week. Some examples of exercise include walking, biking, dancing, and swimming. You can also fit in more physical activity by taking the stairs instead of the elevator or parking farther away from stores. Ask your healthcare provider about the best exercise plan for you.      © Copyright [employer] 2018 Information is for End User's use only and may not be sold, redistributed or otherwise used for commercial purposes. All illustrations and images included in CareNotes® are the copyrighted property of [employer] or [employer]

## 2024-12-12 NOTE — PROGRESS NOTES
Name: Carey Mcdaniel      : 1955      MRN: [id number]  Encounter Provider: Janis Guerrero PA-C  Encounter Date: 2024   Encounter department: [facility]    Assessment & Plan  Medicare annual wellness visit, subsequent         Controlled type 2 diabetes mellitus without complication, without long-term current use of insulin (HCC)    Well controlled with amaryl 1 mg, continue as is, recheck 6 months, get eye exam done    Lab Results   Component Value Date    HGBA1C 6.8 (A) 2024       Orders:    POCT hemoglobin A1c    Albumin / creatinine urine ratio; Future    Comprehensive metabolic panel; Future    Hemoglobin A1C; Future    Lipid Panel with Direct LDL reflex; Future    CBC and differential; Future    Mixed hyperlipidemia    , goal <100, work on diet, refusing statin at this time       Class 2 severe obesity due to excess calories with serious comorbidity and body mass index (BMI) of 38.0 to 38.9 in adult (HCC)    Continue to work on diet modifications and walking       follow up 6 months or sooner     Preventive health issues were discussed with patient, and age appropriate screening tests were ordered as noted in patient's After Visit Summary. Personalized health advice and appropriate referrals for health education or preventive services given if needed, as noted in patient's After Visit Summary.    History of Present Illness     4 days of feeling sick, feverish at night, coughing at night, sore throat, stuffy nose. Right ear ache. Taking no OTC.    Diabetes    Hyperlipidemia       Patient Care Team:  Janis Guererro PA-C as PCP - General (Family Medicine)  Giovanni Hernández OD (Optometry)  Janis Guerrero PA-C (Family Medicine)    Review of Systems   Constitutional: Negative.    HENT: Negative.     Eyes: Negative.    Respiratory: Negative.     Cardiovascular: Negative.    Gastrointestinal: Negative.    Endocrine: Negative.     Genitourinary: Negative.    Musculoskeletal: Negative.    Skin: Negative.    Allergic/Immunologic: Negative.    Neurological: Negative.    Hematological: Negative.    Psychiatric/Behavioral: Negative.       Medical History Reviewed by provider this encounter:       Annual Wellness Visit Questionnaire       Health Risk Assessment:   Patient rates overall health as fair. Patient feels that their physical health rating is same. Patient is satisfied with their life. Eyesight was rated as same. Hearing was rated as same. Patient feels that their emotional and mental health rating is same. Patients states they are never, rarely angry. Patient states they are never, rarely unusually tired/fatigued. Pain experienced in the last 7 days has been a lot. Patient's pain rating has been 7/10. Patient states that she has experienced no weight loss or gain in last 6 months.     Depression Screening:   PHQ-2 Score: 0      Fall Risk Screening:   In the past year, patient has experienced: no history of falling in past year      Urinary Incontinence Screening:   Patient has not leaked urine accidently in the last six months.     Home Safety:  Patient has trouble with stairs inside or outside of their home. Patient has working smoke alarms and has working carbon monoxide detector. Home safety hazards include: none.     Nutrition:   Current diet is Regular.     Medications:   Patient is not currently taking any over-the-counter supplements. Patient is able to manage medications.     Activities of Daily Living (ADLs)/Instrumental Activities of Daily Living (IADLs):   Walk and transfer into and out of bed and chair?: Yes  Dress and groom yourself?: Yes    Bathe or shower yourself?: Yes    Feed yourself? Yes  Do your laundry/housekeeping?: Yes  Manage your money, pay your bills and track your expenses?: Yes  Make your own meals?: Yes    Do your own shopping?: Yes    Previous Hospitalizations:   Any hospitalizations or ED visits within the  last 12 months?: No      Advance Care Planning:   Living will: No    Advanced directive: No    End of Life Decisions reviewed with patient: Yes    Provider agrees with end of life decisions: Yes      Cognitive Screening:   Provider or family/friend/caregiver concerned regarding cognition?: No    PREVENTIVE SCREENINGS      Cardiovascular Screening:    General: Screening Not Indicated and History Lipid Disorder      Diabetes Screening:     General: Screening Not Indicated and History Diabetes      Colorectal Cancer Screening:     General: Screening Current      Breast Cancer Screening:     General: Patient Declines      Cervical Cancer Screening:    General: Screening Not Indicated      Osteoporosis Screening:    General: Patient Declines      Abdominal Aortic Aneurysm (AAA) Screening:        General: Screening Not Indicated      Lung Cancer Screening:     General: Patient Declines      Hepatitis C Screening:    General: Screening Current    Screening, Brief Intervention, and Referral to Treatment (SBIRT)    Screening  Typical number of drinks in a day: 0  Typical number of drinks in a week: 0  Interpretation: Low risk drinking behavior.    Single Item Drug Screening:  How often have you used an illegal drug (including marijuana) or a prescription medication for non-medical reasons in the past year? never    Single Item Drug Screen Score: 0  Interpretation: Negative screen for possible drug use disorder    Brief Intervention  Alcohol & drug use screenings were reviewed. No concerns regarding substance use disorder identified.     Social Drivers of Health     Financial Resource Strain: Low Risk  (9/28/2023)    Overall Financial Resource Strain (CARDIA)     Difficulty of Paying Living Expenses: Not hard at all   Food Insecurity: No Food Insecurity (7/12/2021)    Hunger Vital Sign     Worried About Running Out of Food in the Last Year: Never true     Ran Out of Food in the Last Year: Never true   Transportation Needs: No  Transportation Needs (9/28/2023)    PRAPARE - Transportation     Lack of Transportation (Medical): No     Lack of Transportation (Non-Medical): No   Housing Stability: Low Risk  (7/12/2021)    Housing Stability Vital Sign     Unable to Pay for Housing in the Last Year: No     Number of Places Lived in the Last Year: 1     Unstable Housing in the Last Year: No     No results found.    Objective   There were no vitals taken for this visit.    Physical Exam  Constitutional:       Appearance: Normal appearance. She is well-developed and normal weight.   HENT:      Head: Normocephalic and atraumatic.      Right Ear: Hearing normal.      Left Ear: Hearing normal.      Mouth/Throat:      Pharynx: Uvula midline.   Neck:      Thyroid: No thyromegaly.   Cardiovascular:      Rate and Rhythm: Normal rate and regular rhythm.      Pulses: Normal pulses. no weak pulses.           Dorsalis pedis pulses are 2+ on the right side and 2+ on the left side.        Posterior tibial pulses are 2+ on the right side and 2+ on the left side.      Heart sounds: Normal heart sounds. No murmur heard.  Pulmonary:      Effort: Pulmonary effort is normal.      Breath sounds: Normal breath sounds.   Musculoskeletal:         General: Normal range of motion.      Cervical back: Normal range of motion and neck supple.   Feet:      Right foot:      Skin integrity: No ulcer, skin breakdown, erythema, warmth, callus or dry skin.      Left foot:      Skin integrity: No ulcer, skin breakdown, erythema, warmth, callus or dry skin.   Lymphadenopathy:      Cervical: No cervical adenopathy.   Skin:     General: Skin is warm.   Neurological:      General: No focal deficit present.      Mental Status: She is alert and oriented to person, place, and time.   Psychiatric:         Mood and Affect: Mood normal.         Behavior: Behavior normal.         Thought Content: Thought content normal.         Judgment: Judgment normal.         Diabetic Foot Exam    Patient's  shoes and socks removed.    Right Foot/Ankle   Right Foot Inspection  Skin Exam: skin normal and skin intact. No dry skin, no warmth, no callus, no erythema, no maceration, no abnormal color, no pre-ulcer, no ulcer and no callus.     Toe Exam: ROM and strength within normal limits.     Sensory   Vibration: intact  Proprioception: intact  Monofilament testing: intact    Vascular  Capillary refills: < 3 seconds  The right DP pulse is 2+. The right PT pulse is 2+.     Left Foot/Ankle  Left Foot Inspection  Skin Exam: skin normal and skin intact. No dry skin, no warmth, no erythema, no maceration, normal color, no pre-ulcer, no ulcer and no callus.     Toe Exam: ROM and strength within normal limits.     Sensory   Vibration: intact  Proprioception: intact  Monofilament testing: intact    Vascular  Capillary refills: < 3 seconds  The left DP pulse is 2+. The left PT pulse is 2+.     Assign Risk Category  No deformity present  No loss of protective sensation  No weak pulses  Risk: 0

## 2024-12-16 NOTE — ASSESSMENT & PLAN NOTE
Well controlled with amaryl 1 mg, continue as is, recheck 6 months, get eye exam done    Lab Results   Component Value Date    HGBA1C 6.8 (A) 12/12/2024       Orders:    POCT hemoglobin A1c    Albumin / creatinine urine ratio; Future    Comprehensive metabolic panel; Future    Hemoglobin A1C; Future    Lipid Panel with Direct LDL reflex; Future    CBC and differential; Future

## 2025-04-21 ENCOUNTER — TELEPHONE (OUTPATIENT)
Age: 70
End: 2025-04-21

## 2025-04-21 NOTE — TELEPHONE ENCOUNTER
Patient called asking if labs that are ordered for her 6/12 office visit can be changed to be done now. She called the lab and told her she has to come in on that date to have them done. She is worried about her levels and would really like them done earlier then June. Please review and advise thank you.

## 2025-04-22 DIAGNOSIS — E78.5 HYPERLIPIDEMIA, UNSPECIFIED HYPERLIPIDEMIA TYPE: Primary | ICD-10-CM

## 2025-04-22 DIAGNOSIS — E11.9 CONTROLLED TYPE 2 DIABETES MELLITUS WITHOUT COMPLICATION, WITHOUT LONG-TERM CURRENT USE OF INSULIN (HCC): ICD-10-CM

## 2025-04-22 DIAGNOSIS — R53.83 FATIGUE, UNSPECIFIED TYPE: ICD-10-CM

## 2025-04-22 DIAGNOSIS — E66.812 CLASS 2 SEVERE OBESITY DUE TO EXCESS CALORIES WITH SERIOUS COMORBIDITY AND BODY MASS INDEX (BMI) OF 38.0 TO 38.9 IN ADULT (HCC): ICD-10-CM

## 2025-04-22 DIAGNOSIS — E78.2 MIXED HYPERLIPIDEMIA: ICD-10-CM

## 2025-04-22 DIAGNOSIS — E66.01 CLASS 2 SEVERE OBESITY DUE TO EXCESS CALORIES WITH SERIOUS COMORBIDITY AND BODY MASS INDEX (BMI) OF 38.0 TO 38.9 IN ADULT (HCC): ICD-10-CM

## 2025-04-25 ENCOUNTER — APPOINTMENT (OUTPATIENT)
Dept: LAB | Facility: CLINIC | Age: 70
End: 2025-04-25
Payer: MEDICARE

## 2025-04-25 DIAGNOSIS — R53.83 FATIGUE, UNSPECIFIED TYPE: ICD-10-CM

## 2025-04-25 DIAGNOSIS — E11.9 CONTROLLED TYPE 2 DIABETES MELLITUS WITHOUT COMPLICATION, WITHOUT LONG-TERM CURRENT USE OF INSULIN (HCC): ICD-10-CM

## 2025-04-25 DIAGNOSIS — E78.2 MIXED HYPERLIPIDEMIA: ICD-10-CM

## 2025-04-25 DIAGNOSIS — E78.5 HYPERLIPIDEMIA, UNSPECIFIED HYPERLIPIDEMIA TYPE: ICD-10-CM

## 2025-04-25 LAB
ALBUMIN SERPL BCG-MCNC: 3.9 G/DL (ref 3.5–5)
ALP SERPL-CCNC: 115 U/L (ref 34–104)
ALT SERPL W P-5'-P-CCNC: 8 U/L (ref 7–52)
ANION GAP SERPL CALCULATED.3IONS-SCNC: 7 MMOL/L (ref 4–13)
AST SERPL W P-5'-P-CCNC: 12 U/L (ref 13–39)
BASOPHILS # BLD AUTO: 0.07 THOUSANDS/ÂΜL (ref 0–0.1)
BASOPHILS NFR BLD AUTO: 1 % (ref 0–1)
BILIRUB SERPL-MCNC: 0.4 MG/DL (ref 0.2–1)
BUN SERPL-MCNC: 12 MG/DL (ref 5–25)
CALCIUM SERPL-MCNC: 9 MG/DL (ref 8.4–10.2)
CHLORIDE SERPL-SCNC: 99 MMOL/L (ref 96–108)
CHOLEST SERPL-MCNC: 215 MG/DL (ref ?–200)
CO2 SERPL-SCNC: 32 MMOL/L (ref 21–32)
CREAT SERPL-MCNC: 0.62 MG/DL (ref 0.6–1.3)
CREAT UR-MCNC: 30.2 MG/DL
EOSINOPHIL # BLD AUTO: 0.2 THOUSAND/ÂΜL (ref 0–0.61)
EOSINOPHIL NFR BLD AUTO: 2 % (ref 0–6)
ERYTHROCYTE [DISTWIDTH] IN BLOOD BY AUTOMATED COUNT: 13.4 % (ref 11.6–15.1)
EST. AVERAGE GLUCOSE BLD GHB EST-MCNC: 166 MG/DL
GFR SERPL CREATININE-BSD FRML MDRD: 92 ML/MIN/1.73SQ M
GLUCOSE P FAST SERPL-MCNC: 147 MG/DL (ref 65–99)
HBA1C MFR BLD: 7.4 %
HCT VFR BLD AUTO: 36.7 % (ref 34.8–46.1)
HDLC SERPL-MCNC: 68 MG/DL
HGB BLD-MCNC: 10.7 G/DL (ref 11.5–15.4)
IMM GRANULOCYTES # BLD AUTO: 0.04 THOUSAND/UL (ref 0–0.2)
IMM GRANULOCYTES NFR BLD AUTO: 1 % (ref 0–2)
LDLC SERPL CALC-MCNC: 129 MG/DL (ref 0–100)
LYMPHOCYTES # BLD AUTO: 2.09 THOUSANDS/ÂΜL (ref 0.6–4.47)
LYMPHOCYTES NFR BLD AUTO: 25 % (ref 14–44)
MCH RBC QN AUTO: 27.2 PG (ref 26.8–34.3)
MCHC RBC AUTO-ENTMCNC: 29.2 G/DL (ref 31.4–37.4)
MCV RBC AUTO: 93 FL (ref 82–98)
MICROALBUMIN UR-MCNC: <7 MG/L
MONOCYTES # BLD AUTO: 0.55 THOUSAND/ÂΜL (ref 0.17–1.22)
MONOCYTES NFR BLD AUTO: 7 % (ref 4–12)
NEUTROPHILS # BLD AUTO: 5.51 THOUSANDS/ÂΜL (ref 1.85–7.62)
NEUTS SEG NFR BLD AUTO: 64 % (ref 43–75)
NRBC BLD AUTO-RTO: 0 /100 WBCS
PLATELET # BLD AUTO: 372 THOUSANDS/UL (ref 149–390)
PMV BLD AUTO: 12 FL (ref 8.9–12.7)
POTASSIUM SERPL-SCNC: 4.4 MMOL/L (ref 3.5–5.3)
PROT SERPL-MCNC: 7.1 G/DL (ref 6.4–8.4)
RBC # BLD AUTO: 3.93 MILLION/UL (ref 3.81–5.12)
SODIUM SERPL-SCNC: 138 MMOL/L (ref 135–147)
TRIGL SERPL-MCNC: 91 MG/DL (ref ?–150)
TSH SERPL DL<=0.05 MIU/L-ACNC: 3.25 UIU/ML (ref 0.45–4.5)
WBC # BLD AUTO: 8.46 THOUSAND/UL (ref 4.31–10.16)

## 2025-04-25 PROCEDURE — 85025 COMPLETE CBC W/AUTO DIFF WBC: CPT

## 2025-04-25 PROCEDURE — 80053 COMPREHEN METABOLIC PANEL: CPT

## 2025-04-25 PROCEDURE — 80061 LIPID PANEL: CPT

## 2025-04-25 PROCEDURE — 82570 ASSAY OF URINE CREATININE: CPT

## 2025-04-25 PROCEDURE — 83036 HEMOGLOBIN GLYCOSYLATED A1C: CPT

## 2025-04-25 PROCEDURE — 84443 ASSAY THYROID STIM HORMONE: CPT

## 2025-04-25 PROCEDURE — 36415 COLL VENOUS BLD VENIPUNCTURE: CPT

## 2025-04-25 PROCEDURE — 82043 UR ALBUMIN QUANTITATIVE: CPT

## 2025-04-28 ENCOUNTER — RESULTS FOLLOW-UP (OUTPATIENT)
Dept: FAMILY MEDICINE CLINIC | Facility: CLINIC | Age: 70
End: 2025-04-28

## 2025-06-01 DIAGNOSIS — E11.9 TYPE 2 DIABETES MELLITUS WITHOUT COMPLICATION, WITHOUT LONG-TERM CURRENT USE OF INSULIN (HCC): ICD-10-CM

## 2025-06-01 RX ORDER — GLIMEPIRIDE 1 MG/1
1 TABLET ORAL
Qty: 90 TABLET | Refills: 1 | Status: SHIPPED | OUTPATIENT
Start: 2025-06-01

## 2025-06-04 LAB
LEFT EYE DIABETIC RETINOPATHY: NORMAL
RIGHT EYE DIABETIC RETINOPATHY: NORMAL

## 2025-07-10 ENCOUNTER — OFFICE VISIT (OUTPATIENT)
Dept: FAMILY MEDICINE CLINIC | Facility: CLINIC | Age: 70
End: 2025-07-10
Payer: MEDICARE

## 2025-07-10 VITALS
TEMPERATURE: 98 F | SYSTOLIC BLOOD PRESSURE: 132 MMHG | HEART RATE: 81 BPM | DIASTOLIC BLOOD PRESSURE: 76 MMHG | HEIGHT: 64 IN | WEIGHT: 216 LBS | BODY MASS INDEX: 36.88 KG/M2 | RESPIRATION RATE: 16 BRPM | OXYGEN SATURATION: 98 %

## 2025-07-10 DIAGNOSIS — E11.9 CONTROLLED TYPE 2 DIABETES MELLITUS WITHOUT COMPLICATION, WITHOUT LONG-TERM CURRENT USE OF INSULIN (HCC): Primary | ICD-10-CM

## 2025-07-10 DIAGNOSIS — M54.12 CERVICAL RADICULOPATHY: ICD-10-CM

## 2025-07-10 DIAGNOSIS — E66.812 CLASS 2 SEVERE OBESITY DUE TO EXCESS CALORIES WITH SERIOUS COMORBIDITY AND BODY MASS INDEX (BMI) OF 38.0 TO 38.9 IN ADULT (HCC): ICD-10-CM

## 2025-07-10 DIAGNOSIS — E78.5 HYPERLIPIDEMIA, UNSPECIFIED HYPERLIPIDEMIA TYPE: ICD-10-CM

## 2025-07-10 DIAGNOSIS — E66.01 CLASS 2 SEVERE OBESITY DUE TO EXCESS CALORIES WITH SERIOUS COMORBIDITY AND BODY MASS INDEX (BMI) OF 38.0 TO 38.9 IN ADULT (HCC): ICD-10-CM

## 2025-07-10 DIAGNOSIS — R06.09 DYSPNEA ON EXERTION: ICD-10-CM

## 2025-07-10 PROCEDURE — G2211 COMPLEX E/M VISIT ADD ON: HCPCS | Performed by: PHYSICIAN ASSISTANT

## 2025-07-10 PROCEDURE — 99214 OFFICE O/P EST MOD 30 MIN: CPT | Performed by: PHYSICIAN ASSISTANT

## 2025-07-10 RX ORDER — ALBUTEROL SULFATE 90 UG/1
2 INHALANT RESPIRATORY (INHALATION) EVERY 6 HOURS PRN
Qty: 18 G | Refills: 5 | Status: SHIPPED | OUTPATIENT
Start: 2025-07-10

## 2025-07-10 RX ORDER — TIRZEPATIDE 5 MG/.5ML
5 INJECTION, SOLUTION SUBCUTANEOUS WEEKLY
Qty: 2 ML | Refills: 1 | Status: SHIPPED | OUTPATIENT
Start: 2025-08-09

## 2025-07-10 RX ORDER — TIRZEPATIDE 2.5 MG/.5ML
2.5 INJECTION, SOLUTION SUBCUTANEOUS WEEKLY
Qty: 2 ML | Refills: 0 | Status: SHIPPED | OUTPATIENT
Start: 2025-07-10

## 2025-07-10 NOTE — ASSESSMENT & PLAN NOTE
Lab Results   Component Value Date    HGBA1C 7.4 (H) 04/25/2025     Patient requseting to try mounjaro 2.5 mg, will try this for a month increasing to 5 mg and at 3 months recheck A1C and possibly d/c amaryl.  Orders:    Mounjaro 2.5 MG/0.5ML SOAJ; Inject 2.5 mg under the skin once a week    Mounjaro 5 MG/0.5ML SOAJ; Inject 5 mg under the skin once a week Do not start before August 9, 2025.

## 2025-07-10 NOTE — PROGRESS NOTES
Name: Carey Mcdaniel      : 1955      MRN: [id number]  Encounter Provider: Janis Guerrero PA-C  Encounter Date: 7/10/2025   Encounter department: [facility]  :  Assessment & Plan  Controlled type 2 diabetes mellitus without complication, without long-term current use of insulin ([facility])    Lab Results   Component Value Date    HGBA1C 7.4 (H) 2025     Patient requseting to try mounjaro 2.5 mg, will try this for a month increasing to 5 mg and at 3 months recheck A1C and possibly d/c amaryl.  Orders:    Mounjaro 2.5 MG/0.5ML SOAJ; Inject 2.5 mg under the skin once a week    Mounjaro 5 MG/0.5ML SOAJ; Inject 5 mg under the skin once a week Do not start before 2025.    Cervical radiculopathy    Will start with XR, may need PT  Orders:    XR spine cervical complete 4 or 5 vw non injury; Future    Dyspnea on exertion    Probable COPD, get CXR, try albuterol prior to activities consider PFT  Orders:    XR chest pa and lateral; Future    albuterol (Ventolin HFA) 90 mcg/act inhaler; Inhale 2 puffs every 6 (six) hours as needed for wheezing    Class 2 severe obesity due to excess calories with serious comorbidity and body mass index (BMI) of 38.0 to 38.9 in adult ([facility])    Continue to work on diet, exercise         Hyperlipidemia, unspecified hyperlipidemia type  Refusing statin, continue to work on diet       F/u 3 months with A1C or sooner if needed       History of Present Illness   Has been having pain from neck down to hand/wrist. Worse at night, hard to get comfortable. Difficulty raising left arm. Lifting or moving wrong can get a sharp shoot pain. Last thing she can remember was lifting bags of mulch that did not hurt but problems worsen after.    Patient with a hard time breathing.Does not feel chest pain, like heart, but feels lungs are struggling. No coughing.Started smoking in teens, 14, quit 10 years ago at 59. 1 pack per day.       Review of  "Systems    Objective   /76   Pulse 81   Temp 98 °F (36.7 °C) (Temporal)   Resp 16   Ht 5' 4\" (1.626 m)   Wt 98 kg (216 lb)   SpO2 98%   BMI 37.08 kg/m²      Physical Exam  Constitutional:       Appearance: Normal appearance. She is well-developed and normal weight.   HENT:      Head: Normocephalic and atraumatic.   Neck:      Vascular: No carotid bruit.     Cardiovascular:      Rate and Rhythm: Normal rate and regular rhythm.      Pulses: Normal pulses. no weak pulses.           Dorsalis pedis pulses are 2+ on the right side and 2+ on the left side.        Posterior tibial pulses are 2+ on the right side and 2+ on the left side.      Heart sounds: Normal heart sounds.   Pulmonary:      Effort: Pulmonary effort is normal.      Breath sounds: Normal breath sounds.     Musculoskeletal:         General: Normal range of motion.      Cervical back: Normal range of motion and neck supple.      Right lower leg: No edema.      Left lower leg: No edema.   Feet:      Right foot:      Skin integrity: No ulcer, skin breakdown, erythema, warmth, callus or dry skin.      Left foot:      Skin integrity: No ulcer, skin breakdown, erythema, warmth, callus or dry skin.     Skin:     General: Skin is warm.     Neurological:      General: No focal deficit present.      Mental Status: She is alert and oriented to person, place, and time.     Psychiatric:         Mood and Affect: Mood normal.         Behavior: Behavior normal.         Thought Content: Thought content normal.         Judgment: Judgment normal.     Diabetic Foot Exam    Patient's shoes and socks removed.    Right Foot/Ankle   Right Foot Inspection  Skin Exam: skin normal and skin intact. No dry skin, no warmth, no callus, no erythema, no maceration, no abnormal color, no pre-ulcer, no ulcer and no callus.     Toe Exam: ROM and strength within normal limits.     Sensory   Vibration: intact  Proprioception: intact  Monofilament testing: " intact    Vascular  Capillary refills: < 3 seconds  The right DP pulse is 2+. The right PT pulse is 2+.     Left Foot/Ankle  Left Foot Inspection  Skin Exam: skin normal and skin intact. No dry skin, no warmth, no erythema, no maceration, normal color, no pre-ulcer, no ulcer and no callus.     Toe Exam: ROM and strength within normal limits.     Sensory   Vibration: intact  Proprioception: intact  Monofilament testing: intact    Vascular  Capillary refills: < 3 seconds  The left DP pulse is 2+. The left PT pulse is 2+.     Assign Risk Category  No deformity present  No loss of protective sensation  No weak pulses  Risk: 0

## 2025-07-11 ENCOUNTER — HOSPITAL ENCOUNTER (OUTPATIENT)
Dept: RADIOLOGY | Facility: HOSPITAL | Age: 70
Discharge: HOME | End: 2025-07-11
Payer: MEDICARE

## 2025-07-11 DIAGNOSIS — R06.09 DYSPNEA ON EXERTION: ICD-10-CM

## 2025-07-11 DIAGNOSIS — M54.12 CERVICAL RADICULOPATHY: ICD-10-CM

## 2025-07-11 PROCEDURE — 71046 X-RAY EXAM CHEST 2 VIEWS: CPT

## 2025-07-11 PROCEDURE — 72050 X-RAY EXAM NECK SPINE 4/5VWS: CPT

## 2025-08-08 DIAGNOSIS — M54.12 CERVICAL RADICULOPATHY: Primary | ICD-10-CM

## 2025-08-08 DIAGNOSIS — M62.81 GENERALIZED MUSCLE WEAKNESS: ICD-10-CM
